# Patient Record
Sex: FEMALE | Race: WHITE | NOT HISPANIC OR LATINO | Employment: OTHER | URBAN - METROPOLITAN AREA
[De-identification: names, ages, dates, MRNs, and addresses within clinical notes are randomized per-mention and may not be internally consistent; named-entity substitution may affect disease eponyms.]

---

## 2018-02-28 ENCOUNTER — OFFICE VISIT (OUTPATIENT)
Dept: OBGYN CLINIC | Facility: OTHER | Age: 73
End: 2018-02-28
Payer: COMMERCIAL

## 2018-02-28 ENCOUNTER — HOSPITAL ENCOUNTER (OUTPATIENT)
Dept: RADIOLOGY | Facility: HOSPITAL | Age: 73
Discharge: HOME | End: 2018-02-28
Attending: RADIOLOGY

## 2018-02-28 DIAGNOSIS — M87.052 AVASCULAR NECROSIS OF HIP, LEFT (HCC): Primary | ICD-10-CM

## 2018-02-28 DIAGNOSIS — Z76.89 REFERRAL OF PATIENT WITHOUT EXAMINATION OR TREATMENT: ICD-10-CM

## 2018-02-28 PROCEDURE — 99203 OFFICE O/P NEW LOW 30 MIN: CPT | Performed by: ORTHOPAEDIC SURGERY

## 2018-02-28 RX ORDER — METOPROLOL SUCCINATE 25 MG/1
TABLET, EXTENDED RELEASE ORAL
Refills: 2 | COMMUNITY
Start: 2017-12-26

## 2018-02-28 RX ORDER — MELATONIN: COMMUNITY

## 2018-02-28 RX ORDER — BUDESONIDE AND FORMOTEROL FUMARATE DIHYDRATE 160; 4.5 UG/1; UG/1
AEROSOL RESPIRATORY (INHALATION)
COMMUNITY
Start: 2017-09-16

## 2018-02-28 RX ORDER — MONTELUKAST SODIUM 10 MG/1
10 TABLET ORAL DAILY
Refills: 2 | COMMUNITY
Start: 2018-01-24

## 2018-02-28 RX ORDER — SIMVASTATIN 20 MG
20 TABLET ORAL DAILY
Refills: 0 | COMMUNITY
Start: 2017-12-14

## 2018-02-28 RX ORDER — LEVOTHYROXINE SODIUM 112 UG/1
112 TABLET ORAL DAILY
Refills: 0 | COMMUNITY
Start: 2018-02-14

## 2018-03-01 NOTE — PROGRESS NOTES
Orthopaedic Surgery - Office Note  Pj Cotton (70 y o  female)   : 1945   MRN: [id number]  Encounter Date: 2018    Chief Complaint   Patient presents with    Left Hip - Pain       Assessment / Plan  Left hip avascular necrosis    · The imaging and diagnosis were reviewed with the patient  · Treatment options were discussed, including nonsurgical and surgical  · Given duration and severity of symptoms and impact on quality of life, it seems reasonable to consider total hip arthroplasty  · The details of this procedure were discussed at length  · She is interested in further discussion with one of our hip specialists, and I will provide her with this referral  Return to be determined for further evaluation for CARLOS  History of Present Illness  Pj Cotton is a 81 y o  female who presents for evaluation of left hip pain since 2017  There was no injury  She has had gradual worsening of her pain, which has become particularly severe since 2018  She was initially treated for sciatica and sacroilitis, and she completed a course of therapy which did help with some of her posterior hip pain  However, she has continued to have severe left groin pain that radiates down her thigh almost to the knee  It is worse with ambulation and even with hip ROM  She denies numbness in the LLE  She has been ambulating with a cane  At baseline she is a community ambulator without assistance  She does have a history of non-Hodgkins lymphoma and has received prolonged courses of oral steroids as well as radiation treatments  She has been in remission for several years  Review of Systems  Pertinent items are noted in HPI  All other systems were reviewed and are negative  Physical Exam  There were no vitals taken for this visit  Cons: Appears well  No apparent distress  Psych: Alert  Oriented x3  Mood and affect normal   Eyes: PERRLA, EOMI  Resp: Normal effort    No audible wheezing or stridor  CV: Palpable pulse  No discernable arrhythmia  No LE edema  Lymph:  No palpable cervical, axillary, or inguinal lymphadenopathy  Skin: Warm  No palpable masses  No visible lesions  Neuro: Normal muscle tone  Normal and symmetric DTR's  Left Hip Exam  Alignment / Posture:  Normal resting hip posture  Leg lengths appear equal   Inspection:  No swelling  No edema  Palpation:  moderate groin tenderness  ROM:  Hip Flexion 100  Hip ER 40  Hip IR 30  Strength:  Hip Flexors 4/5  Hip Abductors 4/5  Stability:  Not tested  Tests:  (+) [facility]  (-) Straight leg raise  Neurovascular:  Sensation intact in DP/SP/Mancera/Sa/T nerve distributions  2+ DP & PT pulses  Gait:  Antalgic  Studies Reviewed  I have personally reviewed pertinent films in PACS  XR of left hip - avscular necrosis involving the femoral head with moderate collapse and loss of sphericity of the head  CT of left hip - avascular necrosis with loss of sphereicity of femoral head is again seen  There are no acute fractures  Procedures  No procedures today  Medical, Surgical, Family, and Social History  The patient's medical history, family history, and social history, were reviewed and updated as appropriate  No past medical history on file  Past Surgical History:   Procedure Laterality Date    ELBOW SURGERY  12/2006    HYSTERECTOMY      INSERTION CENTRAL VENOUS ACCESS DEVICE W/ SUBCUTANEOUS PORT  2000    removal was 2012       Family History   Problem Relation Age of Onset    Diabetes Maternal Aunt     Diabetes Maternal Uncle        Social History     Occupational History    Not on file       Social History Main Topics    Smoking status: Never Smoker    Smokeless tobacco: Never Used    Alcohol use Not on file    Drug use: Unknown    Sexual activity: Not on file       No Known Allergies      Current Outpatient Prescriptions:     budesonide-formoterol (SYMBICORT) 160-4 5 mcg/act inhaler, USE EVERY 12 HOURS AS DIRECTED BY MD, Disp: , Rfl:     cholecalciferol (VITAMIN D3) 1,000 units tablet, Take by mouth, Disp: , Rfl:     denosumab (PROLIA) 60 mg/mL, Inject under the skin, Disp: , Rfl:     diclofenac sodium (VOLTAREN) 1 %, APPLY AS DIRECTED TO AFFECTED AREA FOUR TIMES A DAY TO LEFT KNEE, Disp: , Rfl: 0    levothyroxine 112 mcg tablet, Take 112 mcg by mouth daily, Disp: , Rfl: 0    MAGNESIUM PO, Take by mouth, Disp: , Rfl:     metoprolol succinate (TOPROL-XL) 25 mg 24 hr tablet, TAKE 1 TABLET IN THE MORNING AND 1/2 AT BEDTIME AS NEEDED FOR PALPITATION, Disp: , Rfl: 2    montelukast (SINGULAIR) 10 mg tablet, Take 10 mg by mouth daily, Disp: , Rfl: 2    MULTIPLE VITAMINS-MINERALS PO, Take by mouth, Disp: , Rfl:     simvastatin (ZOCOR) 20 mg tablet, Take 20 mg by mouth daily, Disp: , Rfl: 0    Tiotropium Bromide Monohydrate (SPIRIVA RESPIMAT) 1 25 MCG/ACT AERS, INHALE 2 PUFFS ONCE DAILY, Disp: , Rfl:       Omar Pickett MD    Scribe Attestation    I,:    am acting as a scribe while in the presence of the attending physician :        I,:    personally performed the services described in this documentation    as scribed in my presence :

## 2018-03-08 ENCOUNTER — OFFICE VISIT (OUTPATIENT)
Dept: OBGYN CLINIC | Facility: HOSPITAL | Age: 73
End: 2018-03-08
Payer: COMMERCIAL

## 2018-03-08 VITALS
HEIGHT: 65 IN | RESPIRATION RATE: 18 BRPM | SYSTOLIC BLOOD PRESSURE: 163 MMHG | BODY MASS INDEX: 24.49 KG/M2 | WEIGHT: 147 LBS | DIASTOLIC BLOOD PRESSURE: 88 MMHG | HEART RATE: 64 BPM

## 2018-03-08 DIAGNOSIS — M87.052 AVASCULAR NECROSIS OF HIP, LEFT (HCC): Primary | ICD-10-CM

## 2018-03-08 DIAGNOSIS — M16.12 ARTHRITIS OF LEFT HIP: ICD-10-CM

## 2018-03-08 PROCEDURE — 99213 OFFICE O/P EST LOW 20 MIN: CPT | Performed by: ORTHOPAEDIC SURGERY

## 2018-03-08 RX ORDER — CHLORHEXIDINE GLUCONATE 4 G/100ML
SOLUTION TOPICAL DAILY PRN
Status: CANCELLED | OUTPATIENT
Start: 2018-03-08

## 2018-03-08 RX ORDER — FOLIC ACID 1 MG/1
1 TABLET ORAL DAILY
Qty: 30 TABLET | Refills: 0 | Status: SHIPPED | OUTPATIENT
Start: 2018-03-08 | End: 2018-05-24

## 2018-03-08 RX ORDER — FERROUS SULFATE TAB EC 324 MG (65 MG FE EQUIVALENT) 324 (65 FE) MG
324 TABLET DELAYED RESPONSE ORAL
Qty: 60 TABLET | Refills: 0 | Status: SHIPPED | OUTPATIENT
Start: 2018-03-08 | End: 2018-05-24

## 2018-03-08 RX ORDER — TRAMADOL HYDROCHLORIDE 50 MG/1
50 TABLET ORAL EVERY 6 HOURS PRN
COMMUNITY
End: 2018-04-03 | Stop reason: HOSPADM

## 2018-03-08 NOTE — PROGRESS NOTES
81 y o female with history of asthma and non-Hodgkin's lymphoma which is treated with a long course of steroids in the early 2000s presenting with insidious atraumatic onset of left groin pain  She says she 1st noticed this past December but acutely worsened in January  Pain is located in the anterior aspect of her hip and radiates down the front of her thigh  Chest pain planes of minimal pain on the lateral aspect of her that is made worse direct contact area  Groin pain is made worse with any ambulation and relieved by rest   She has tried a course of physical therapy and ultrasound which failed to provide her with any significant relief  Prior to November she was able ambulate without any assistive device however she has just started at the use a cane secondary to her pain      Review of Systems  Review of systems negative unless otherwise specified in HPI    Past Medical History  Past Medical History:   Diagnosis Date    Asthma        Past Surgical History  Past Surgical History:   Procedure Laterality Date    ELBOW SURGERY  12/2006    HYSTERECTOMY      INSERTION CENTRAL VENOUS ACCESS DEVICE W/ SUBCUTANEOUS PORT  2000    removal was 2012       Current Medications  Current Outpatient Prescriptions on File Prior to Visit   Medication Sig Dispense Refill    budesonide-formoterol (SYMBICORT) 160-4 5 mcg/act inhaler USE EVERY 12 HOURS AS DIRECTED BY MD      cholecalciferol (VITAMIN D3) 1,000 units tablet Take by mouth      denosumab (PROLIA) 60 mg/mL Inject under the skin      diclofenac sodium (VOLTAREN) 1 % APPLY AS DIRECTED TO AFFECTED AREA FOUR TIMES A DAY TO LEFT KNEE  0    levothyroxine 112 mcg tablet Take 112 mcg by mouth daily  0    MAGNESIUM PO Take by mouth      metoprolol succinate (TOPROL-XL) 25 mg 24 hr tablet TAKE 1 TABLET IN THE MORNING AND 1/2 AT BEDTIME AS NEEDED FOR PALPITATION  2    montelukast (SINGULAIR) 10 mg tablet Take 10 mg by mouth daily  2    MULTIPLE VITAMINS-MINERALS PO Take by mouth      simvastatin (ZOCOR) 20 mg tablet Take 20 mg by mouth daily  0    Tiotropium Bromide Monohydrate (SPIRIVA RESPIMAT) 1 25 MCG/ACT AERS INHALE 2 PUFFS ONCE DAILY       No current facility-administered medications on file prior to visit  Recent Labs (HCT,HGB,PT,INR,ESR,CRP,GLU,HgA1C)  No results found for: HCT, HGB, WBC, PT, INR, ESR, CRP, GLUCOSE, HGBA1C      Physical exam  · General: Awake, Alert, Oriented  · Eyes: Pupils equal, round and reactive to light  · Heart: regular rate and rhythm  · Lungs: No audible wheezing  · Abdomen: soft  Left lower extremity  · Skin intact  · Non tender to palpation over proximal thigh  · Painful hip flexion and internal rotation  · 4/5 hip flexor/extensor  5/5 knee flexion/extension abduction, and adduction strength  · Stichfield positive  · Sensation intact in s/s/sp/dp/t  · Motor intact fhl/ehl/ta/gsc  · Leg warm and well perfused  Imaging  X-rays of the left hip shows flattening of the femoral head joint space narrowing osteophytic changes    Assessment/Plan:   81 y  o female left hip avascular necrosis with arthritic changes of refractory to previous conservative treatments    · Treatments were discussed with the patient including continued conservative treatment and surgery  A joint decision was made with patient proceed with a left anterior total hip arthroplasty  Risks and benefits were explained including bleeding, infection, persistent pain, dementia nerves or blood vessels, leg-length difference, loss of limb, death, pulmonary embolism and blood clots  Informed consent was obtained she is booked for surgery accordingly  No guarantees were given   We will see patient back in follow-up on a postoperative basis

## 2018-03-09 DIAGNOSIS — M16.12 PRIMARY OSTEOARTHRITIS OF LEFT HIP: Primary | ICD-10-CM

## 2018-03-15 ENCOUNTER — APPOINTMENT (OUTPATIENT)
Dept: LAB | Facility: HOSPITAL | Age: 73
End: 2018-03-15
Payer: COMMERCIAL

## 2018-03-15 ENCOUNTER — TRANSCRIBE ORDERS (OUTPATIENT)
Dept: LAB | Facility: HOSPITAL | Age: 73
End: 2018-03-15

## 2018-03-15 ENCOUNTER — OFFICE VISIT (OUTPATIENT)
Dept: LAB | Facility: HOSPITAL | Age: 73
End: 2018-03-15
Payer: COMMERCIAL

## 2018-03-15 ENCOUNTER — HOSPITAL ENCOUNTER (OUTPATIENT)
Dept: RADIOLOGY | Facility: HOSPITAL | Age: 73
Discharge: HOME | End: 2018-03-15
Payer: COMMERCIAL

## 2018-03-15 DIAGNOSIS — M16.12 ARTHRITIS OF LEFT HIP: ICD-10-CM

## 2018-03-15 LAB
ABO GROUP BLD: NORMAL
ANION GAP SERPL CALCULATED.3IONS-SCNC: 8 MMOL/L (ref 4–13)
APTT PPP: 26 SECONDS (ref 23–35)
ATRIAL RATE: 72 BPM
BASOPHILS # BLD AUTO: 0.02 THOUSANDS/ΜL (ref 0–0.1)
BASOPHILS NFR BLD AUTO: 0 % (ref 0–1)
BILIRUB UR QL STRIP: NEGATIVE
BLD GP AB SCN SERPL QL: NEGATIVE
BUN SERPL-MCNC: 35 MG/DL (ref 5–25)
CALCIUM SERPL-MCNC: 9.4 MG/DL (ref 8.3–10.1)
CHLORIDE SERPL-SCNC: 105 MMOL/L (ref 100–108)
CLARITY UR: CLEAR
CO2 SERPL-SCNC: 26 MMOL/L (ref 21–32)
COLOR UR: YELLOW
CREAT SERPL-MCNC: 1.46 MG/DL (ref 0.6–1.3)
EOSINOPHIL # BLD AUTO: 0.11 THOUSAND/ΜL (ref 0–0.61)
EOSINOPHIL NFR BLD AUTO: 1 % (ref 0–6)
ERYTHROCYTE [DISTWIDTH] IN BLOOD BY AUTOMATED COUNT: 13.9 % (ref 11.6–15.1)
EST. AVERAGE GLUCOSE BLD GHB EST-MCNC: 123 MG/DL
GFR SERPL CREATININE-BSD FRML MDRD: 36 ML/MIN/1.73SQ M
GLUCOSE SERPL-MCNC: 95 MG/DL (ref 65–140)
GLUCOSE UR STRIP-MCNC: NEGATIVE MG/DL
HBA1C MFR BLD: 5.9 % (ref 4.2–6.3)
HCT VFR BLD AUTO: 35 % (ref 34.8–46.1)
HGB BLD-MCNC: 11.4 G/DL (ref 11.5–15.4)
HGB UR QL STRIP.AUTO: NEGATIVE
INR PPP: 0.99 (ref 0.86–1.16)
KETONES UR STRIP-MCNC: NEGATIVE MG/DL
LEUKOCYTE ESTERASE UR QL STRIP: NEGATIVE
LYMPHOCYTES # BLD AUTO: 1.6 THOUSANDS/ΜL (ref 0.6–4.47)
LYMPHOCYTES NFR BLD AUTO: 17 % (ref 14–44)
MCH RBC QN AUTO: 29.4 PG (ref 26.8–34.3)
MCHC RBC AUTO-ENTMCNC: 32.6 G/DL (ref 31.4–37.4)
MCV RBC AUTO: 90 FL (ref 82–98)
MONOCYTES # BLD AUTO: 1.02 THOUSAND/ΜL (ref 0.17–1.22)
MONOCYTES NFR BLD AUTO: 11 % (ref 4–12)
NEUTROPHILS # BLD AUTO: 6.51 THOUSANDS/ΜL (ref 1.85–7.62)
NEUTS SEG NFR BLD AUTO: 71 % (ref 43–75)
NITRITE UR QL STRIP: NEGATIVE
NRBC BLD AUTO-RTO: 0 /100 WBCS
P AXIS: 56 DEGREES
PH UR STRIP.AUTO: 5 [PH] (ref 4.5–8)
PLATELET # BLD AUTO: 269 THOUSANDS/UL (ref 149–390)
PMV BLD AUTO: 11.3 FL (ref 8.9–12.7)
POTASSIUM SERPL-SCNC: 4.4 MMOL/L (ref 3.5–5.3)
PR INTERVAL: 166 MS
PROT UR STRIP-MCNC: NEGATIVE MG/DL
PROTHROMBIN TIME: 13.1 SECONDS (ref 12.1–14.4)
QRS AXIS: 10 DEGREES
QRSD INTERVAL: 96 MS
QT INTERVAL: 424 MS
QTC INTERVAL: 464 MS
RBC # BLD AUTO: 3.88 MILLION/UL (ref 3.81–5.12)
RH BLD: POSITIVE
SODIUM SERPL-SCNC: 139 MMOL/L (ref 136–145)
SP GR UR STRIP.AUTO: 1.01 (ref 1–1.03)
SPECIMEN EXPIRATION DATE: NORMAL
T WAVE AXIS: 51 DEGREES
UROBILINOGEN UR QL STRIP.AUTO: 0.2 E.U./DL
VENTRICULAR RATE: 72 BPM
WBC # BLD AUTO: 9.27 THOUSAND/UL (ref 4.31–10.16)

## 2018-03-15 PROCEDURE — 85025 COMPLETE CBC W/AUTO DIFF WBC: CPT

## 2018-03-15 PROCEDURE — 85610 PROTHROMBIN TIME: CPT

## 2018-03-15 PROCEDURE — 71046 X-RAY EXAM CHEST 2 VIEWS: CPT

## 2018-03-15 PROCEDURE — 86900 BLOOD TYPING SEROLOGIC ABO: CPT

## 2018-03-15 PROCEDURE — 83036 HEMOGLOBIN GLYCOSYLATED A1C: CPT

## 2018-03-15 PROCEDURE — 93005 ELECTROCARDIOGRAM TRACING: CPT

## 2018-03-15 PROCEDURE — 81003 URINALYSIS AUTO W/O SCOPE: CPT

## 2018-03-15 PROCEDURE — 93010 ELECTROCARDIOGRAM REPORT: CPT | Performed by: INTERNAL MEDICINE

## 2018-03-15 PROCEDURE — 80048 BASIC METABOLIC PNL TOTAL CA: CPT

## 2018-03-15 PROCEDURE — 86850 RBC ANTIBODY SCREEN: CPT

## 2018-03-15 PROCEDURE — 36415 COLL VENOUS BLD VENIPUNCTURE: CPT

## 2018-03-15 PROCEDURE — 85730 THROMBOPLASTIN TIME PARTIAL: CPT

## 2018-03-15 PROCEDURE — 86901 BLOOD TYPING SEROLOGIC RH(D): CPT

## 2018-03-15 NOTE — PRE-PROCEDURE INSTRUCTIONS
Pre-Surgery Instructions:   Medication Instructions    ascorbic acid (VITAMIN C) 500 MG TBCR Instructed patient per Anesthesia Guidelines   budesonide-formoterol (SYMBICORT) 160-4 5 mcg/act inhaler Instructed patient per Anesthesia Guidelines   diclofenac sodium (VOLTAREN) 1 % Instructed patient per Anesthesia Guidelines   ferrous sulfate 324 (65 Fe) mg Instructed patient per Anesthesia Guidelines   folic acid (FOLVITE) 1 mg tablet Instructed patient per Anesthesia Guidelines   levothyroxine 112 mcg tablet Instructed patient per Anesthesia Guidelines   metoprolol succinate (TOPROL-XL) 25 mg 24 hr tablet Instructed patient per Anesthesia Guidelines   montelukast (SINGULAIR) 10 mg tablet Instructed patient per Anesthesia Guidelines   MULTIPLE VITAMINS-MINERALS PO Instructed patient per Anesthesia Guidelines   simvastatin (ZOCOR) 20 mg tablet Instructed patient per Anesthesia Guidelines   Tiotropium Bromide Monohydrate (SPIRIVA RESPIMAT) 1 25 MCG/ACT AERS Instructed patient per Anesthesia Guidelines   traMADol (ULTRAM) 50 mg tablet Instructed patient per Anesthesia Guidelines  Beta blocker Med Class     Continue to take this heart medication on your normal schedule  If this is an oral medication and you take it in the morning, then you may take this medicine with a sip of water  Low Molecular Weight Heparin Med Class     Stop taking this medication at least 12-24 hours prior to surgery/procedure with prescribing Physician and Surgeon consultation  Inhalational Med Class     Continue to take these inhaler medications on your normal schedule up to and including the day of surgery  Leukotriene Inhibitor Med Class     Continue to take this medication on your normal schedule  If this is an oral medication and you take it in the morning, then you may take this medicine with a sip of water  Opioid Med Class     Continue to take this medication on your normal schedule    If this is an oral medication and you take it in the morning, then you may take this medicine with a sip of water  Statin Med Class     Continue to take this medication on your normal schedule  If this is an oral medication and you take it in the morning, then you may take this medicine with a sip of water  Thyroxine Med Class     Continue to take this medication on your normal schedule  If this is an oral medication and you take it in the morning, then you may take this medicine with a sip of water

## 2018-03-20 ENCOUNTER — TELEPHONE (OUTPATIENT)
Dept: OBGYN CLINIC | Facility: HOSPITAL | Age: 73
End: 2018-03-20

## 2018-03-20 ENCOUNTER — APPOINTMENT (OUTPATIENT)
Dept: PHYSICAL THERAPY | Facility: CLINIC | Age: 73
End: 2018-03-20
Payer: COMMERCIAL

## 2018-03-20 ENCOUNTER — OFFICE VISIT (OUTPATIENT)
Dept: NEPHROLOGY | Facility: CLINIC | Age: 73
End: 2018-03-20
Payer: COMMERCIAL

## 2018-03-20 VITALS
RESPIRATION RATE: 98 BRPM | WEIGHT: 147.4 LBS | HEIGHT: 65 IN | SYSTOLIC BLOOD PRESSURE: 160 MMHG | DIASTOLIC BLOOD PRESSURE: 78 MMHG | BODY MASS INDEX: 24.56 KG/M2 | HEART RATE: 70 BPM

## 2018-03-20 DIAGNOSIS — N18.30 CKD (CHRONIC KIDNEY DISEASE) STAGE 3, GFR 30-59 ML/MIN (HCC): ICD-10-CM

## 2018-03-20 DIAGNOSIS — Z01.818 PRE-OP EVALUATION: Primary | ICD-10-CM

## 2018-03-20 DIAGNOSIS — M16.12 ARTHRITIS OF LEFT HIP: ICD-10-CM

## 2018-03-20 LAB
SL AMB  POCT GLUCOSE, UA: ABNORMAL
SL AMB LEUKOCYTE ESTERASE,UA: ABNORMAL
SL AMB POCT BILIRUBIN,UA: ABNORMAL
SL AMB POCT BLOOD,UA: ABNORMAL
SL AMB POCT CLARITY,UA: ABNORMAL
SL AMB POCT COLOR,UA: YELLOW
SL AMB POCT KETONES,UA: 15
SL AMB POCT NITRITE,UA: ABNORMAL
SL AMB POCT PH,UA: 6
SL AMB POCT SPECIFIC GRAVITY,UA: 1.02
SL AMB POCT URINE PROTEIN: ABNORMAL
SL AMB POCT UROBILINOGEN: ABNORMAL

## 2018-03-20 PROCEDURE — 99204 OFFICE O/P NEW MOD 45 MIN: CPT | Performed by: INTERNAL MEDICINE

## 2018-03-20 PROCEDURE — 81002 URINALYSIS NONAUTO W/O SCOPE: CPT | Performed by: INTERNAL MEDICINE

## 2018-03-20 NOTE — PROGRESS NOTES
NEPHROLOGY OUTPATIENT CONSULTATION   Sergio Sullivan 81 y o  female MRN: [id number]    Reason for consultation:   Chronic kidney disease    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, anticipated total joint replacement  3  History of non-Hodgkin's lymphoma with history of stem cell transplant, previously treated with [facility], last treatment 2003  4  Anemia, hemoglobin 11 4, immunofixation in 2016 was negative for monoclonal, gammopathy    · Discontinue NSAIDs if possible, at the very least try to limit the dose  · Increase fluid intake  · Repeat BMP in 1 week  · Check renal ultrasound for size and echogenicity  · Assuming renal function is stable, plan is for approximately 3 months postoperatively  HISTORY OF PRESENT ILLNESS:  Connie Dominique as we know is a 81    Review of Systems   Constitutional: Negative for fatigue and fever  Respiratory: Negative for chest tightness and shortness of breath  Cardiovascular: Negative for chest pain and leg swelling  Gastrointestinal: Negative for abdominal pain, diarrhea and nausea  Genitourinary: Negative for dysuria  Musculoskeletal: Positive for arthralgias (left hip)  Neurological: Negative for dizziness, syncope and light-headedness         PAST MEDICAL HISTORY:  Past Medical History:   Diagnosis Date    Asthma     Cancer ([facility] [address] )     non hodgkins lymphoma 2001    Hyperlipidemia     Irregular heart beat        PAST SURGICAL HISTORY:  Past Surgical History:   Procedure Laterality Date    ELBOW SURGERY  12/2006    HYSTERECTOMY      INSERTION CENTRAL VENOUS ACCESS DEVICE W/ SUBCUTANEOUS PORT  2000    removal was 2012    LIMBAL STEM CELL TRANSPLANT      2004    LYMPH NODE BIOPSY      2000    REMOVAL VENOUS PORT (PORT-A-CATH)         ALLERGIES:  Allergies   Allergen Reactions    Celecoxib     Epstein-2 Inhibitors        SOCIAL HISTORY:  History   Alcohol Use    Yes     Comment: occ     History   Drug Use No     History   Smoking Status    Never Smoker   Smokeless Tobacco    Never Used       FAMILY HISTORY:  Family History   Problem Relation Age of Onset    Diabetes Maternal Aunt     Diabetes Maternal Uncle     No Known Problems Mother     No Known Problems Father        MEDICATIONS:    Current Outpatient Prescriptions:     ascorbic acid (VITAMIN C) 500 MG TBCR, Take 1 tablet (500 mg total) by mouth 2 (two) times a day, Disp: 60 each, Rfl: 0    budesonide-formoterol (SYMBICORT) 160-4 5 mcg/act inhaler, USE EVERY 12 HOURS AS DIRECTED BY MD, Disp: , Rfl:     cholecalciferol (VITAMIN D3) 1,000 units tablet, Take by mouth, Disp: , Rfl:     diclofenac sodium (VOLTAREN) 1 %, APPLY AS DIRECTED TO AFFECTED AREA FOUR TIMES A DAY TO LEFT KNEE, Disp: , Rfl: 0    enoxaparin (LOVENOX) 40 mg/0 4 mL, Inject 0 4 mL (40 mg total) under the skin daily for 28 days, Disp: 11 2 mL, Rfl: 0    ferrous sulfate 324 (65 Fe) mg, Take 1 tablet (324 mg total) by mouth 2 (two) times a day before meals, Disp: 60 tablet, Rfl: 0    folic acid (FOLVITE) 1 mg tablet, Take 1 tablet (1 mg total) by mouth daily, Disp: 30 tablet, Rfl: 0    levothyroxine 112 mcg tablet, Take 112 mcg by mouth daily, Disp: , Rfl: 0    MAGNESIUM PO, Take 500 mg by mouth  , Disp: , Rfl:     metoprolol succinate (TOPROL-XL) 25 mg 24 hr tablet, TAKE 1 TABLET IN THE MORNING AND 1/2 AT BEDTIME AS NEEDED FOR PALPITATION, Disp: , Rfl: 2    montelukast (SINGULAIR) 10 mg tablet, Take 10 mg by mouth daily, Disp: , Rfl: 2    MULTIPLE VITAMINS-MINERALS PO, Take by mouth, Disp: , Rfl:     simvastatin (ZOCOR) 20 mg tablet, Take 20 mg by mouth daily, Disp: , Rfl: 0    Tiotropium Bromide Monohydrate (SPIRIVA RESPIMAT) 1 25 MCG/ACT AERS, INHALE 2 PUFFS ONCE DAILY, Disp: , Rfl:     traMADol (ULTRAM) 50 mg tablet, Take 50 mg by mouth every 6 (six) hours as needed for moderate pain, Disp: , Rfl:         PHYSICAL EXAM:  Vitals:    03/20/18 1316   BP: 160/78   BP Location: Left arm   Patient Position: Sitting   Cuff Size: Standard   Pulse: 70   Resp: (!) 98   Weight: 66 9 kg (147 lb 6 4 oz)   Height: 5' 5" (1 651 m)       Physical Exam   Constitutional: She is oriented to person, place, and time  She appears well-developed  No distress  HENT:   Head: Normocephalic  Eyes: Conjunctivae are normal  No scleral icterus  Neck: Neck supple  Cardiovascular: Normal rate and regular rhythm  Pulmonary/Chest: Breath sounds normal  No respiratory distress  She has no wheezes  Abdominal: Bowel sounds are normal  She exhibits no distension  There is no tenderness  Musculoskeletal: She exhibits no edema  Neurological: She is alert and oriented to person, place, and time  Skin: Skin is warm and dry  Psychiatric: She has a normal mood and affect           Laboratory results:   Results for orders placed or performed in visit on 03/20/18   POCT urine dip   Result Value Ref Range    LEUKOCYTE ESTERASE,UA neg      NITRITE,UA neg     SL AMB POCT UROBILINOGEN neg     SL AMB POCT URINE PROTEIN trace      PH,UA 6      BLOOD,UA trace      SPECIFIC GRAVITY,UA 1 020      KETONES,UA 15      BILIRUBIN,UA neg     GLUCOSE, UA neg      COLOR,UA yellow      CLARITY,UA hazy

## 2018-03-22 ENCOUNTER — TELEPHONE (OUTPATIENT)
Dept: OBGYN CLINIC | Facility: HOSPITAL | Age: 73
End: 2018-03-22

## 2018-03-22 NOTE — TELEPHONE ENCOUNTER
Call placed to do pre-op elective admission assessment, spoke with pt  Med list, hx and allergies reviewed with pt  Pt reports she lives alone in a multi-level home  She reports her sister, Karon Dowell, is staying with pt x1 week postoperatively to care for pt  Pt has 2 steps to get into the home and enters to the living level  She reports she already has a first floor setup because she lives on the 1st floor  She has a walk in shower  She reports she has a walker at home without wheels, I suggested she needed one with wheels  Pt reports she will try to obtain a walker with wheels PTA  She has a cane, she is currently ambulating with the cane  She does not have a bedside commode but reports she just had a raised toilet seat installed  She is currently independent with ADLs  Pt uses [facility] on [address] in [facility]  She reports her lovenox is ready to  at her pharmacy, she is planning to pick it up PTA  Pt self manages her meds and uses a pillbox  Pt is planning for outpt PT at [facility]  Pt reporting she has repeat blood work for nephrologist scheduled on 3/27  She denies any flank region pain  Pt denies currently denies CP/SOB  Pt denies having questions/concerns at this time  Pt enrolled in [facility]

## 2018-03-23 ENCOUNTER — ANESTHESIA EVENT (OUTPATIENT)
Dept: PERIOP | Facility: HOSPITAL | Age: 73
DRG: 470 | End: 2018-03-23
Payer: COMMERCIAL

## 2018-03-23 ENCOUNTER — EVALUATION (OUTPATIENT)
Dept: PHYSICAL THERAPY | Facility: CLINIC | Age: 73
End: 2018-03-23
Payer: COMMERCIAL

## 2018-03-23 DIAGNOSIS — M25.552 HIP PAIN, LEFT: Primary | ICD-10-CM

## 2018-03-23 DIAGNOSIS — M16.12 ARTHRITIS OF LEFT HIP: ICD-10-CM

## 2018-03-23 DIAGNOSIS — M87.052 AVASCULAR NECROSIS OF HIP, LEFT (HCC): ICD-10-CM

## 2018-03-23 PROCEDURE — G8979 MOBILITY GOAL STATUS: HCPCS | Performed by: PHYSICAL THERAPIST

## 2018-03-23 PROCEDURE — 97110 THERAPEUTIC EXERCISES: CPT | Performed by: PHYSICAL THERAPIST

## 2018-03-23 PROCEDURE — 97163 PT EVAL HIGH COMPLEX 45 MIN: CPT | Performed by: PHYSICAL THERAPIST

## 2018-03-23 PROCEDURE — G8978 MOBILITY CURRENT STATUS: HCPCS | Performed by: PHYSICAL THERAPIST

## 2018-03-23 NOTE — LETTER
2018    Kel Vences MD  [address]    Patient: Charlie Puga   YOB: 1945   Date of Visit: 3/23/2018     Encounter Diagnosis     ICD-10-CM    1  Hip pain, left M25 552    2  Arthritis of left hip M16 12    3  Avascular necrosis of hip, left [facility]) M87 052        Dear Dr Bridger Fine:    Please review the attached Plan of Care from [facility] recent visit  Please verify that you agree therapy should continue by signing the attached document and sending it back to our office  If you have any questions or concerns, please don't hesitate to call  Sincerely,    Yvonne Perry, PT      Referring Provider:      I certify that I have read the below Plan of Care and certify the need for these services furnished under this plan of treatment while under my care  Kel Vences MD  [facility]          PT Evaluation     Today's date: 3/23/2018  Patient name: Charlie Puga  : 1945  MRN: [id number]  Referring provider: Zoe Aldana MD  Dx:   Encounter Diagnosis     ICD-10-CM    1  Hip pain, left M25 552        Start Time: 0830  Stop Time: 930  Total time in clinic (min): 60 minutes    Assessment    Assessment details: Charlie Puga 81 y o  female presents a three month history of persistent left hip pain  The patient reports no trauma or injury to her left hip  Recent bone scan revealed avascular necrosis of her femoral head, and she is scheduled for left THR (anterior approach) 2018  The patient is seen today to educated and instruct the patient in preparation for her surgery  L Hip Pain rated  8 /10; at worse with weight bearing and walking  L Hip AROM: F= 70   ABD= 12    EXT= 4  Er= 15   IR=  4  L Hip Strength: F 2/5   Abd 1/5  E -3/5  LKnee strength  F 3+   E -4  Patient is ambulating with  walker and or cane    Gait pattern is painful  with decreased step length and gait speed   The patient has difficulty with transfers in and out of bed, and in and out of car  Unable to climb steps reciprocally  Goals  STG   Increase ROM and strength left hip and knee - post THR  Improved ambulation with straight cane - post THR  LTG  Maximize ROM and strength left hip  Resume normal activites      Plan  Plan details: Patient seen for pre-operative visit, and provided information and education in post [facility]  Await post operative orders to initiate rehab program, including TE, and HEP  MT and Gait training         Subjective Patient presents painful gait and limitations in left hip ROM    Objective     L Hip Pain rated  8 /10; at worse with weight bearing and walking  L Hip AROM: F= 70   ABD= 12    EXT= 4  Er= 15   IR=  4  L Hip Strength: F 2/5   Abd 1/5  E -3/5  LKnee strength  F 3+   E -4  Patient is ambulating with  walker and or cane  Gait pattern is painful  with decreased step length and gait speed    Flowsheet Rows    Flowsheet Row Most Recent Value   PT/OT G-Codes   FOTO information reviewed  Yes   Assessment Type  Evaluation   G code set  Mobility: Walking & Moving Around   Mobility: Walking and Moving Around Current Status ()  CM   Mobility: Walking and Moving Around Goal Status ()  [facility]

## 2018-03-23 NOTE — PROGRESS NOTES
PT Evaluation     Today's date: 3/23/2018  Patient name: Clarisa Fowler  : 1945  MRN: [id number]  Referring provider: Jason Anderson MD  Dx:   Encounter Diagnosis     ICD-10-CM    1  Hip pain, left M25 552        Start Time: 830  Stop Time: 930  Total time in clinic (min): 60 minutes    Assessment    Assessment details: Clarisa Fowler 81 y o  female presents a three month history of persistent left hip pain  The patient reports no trauma or injury to her left hip  Recent bone scan revealed avascular necrosis of her femoral head, and she is scheduled for left THR (anterior approach) 2018  The patient is seen today to educated and instruct the patient in preparation for her surgery  L Hip Pain rated  8 /10; at worse with weight bearing and walking  L Hip AROM: F= 70   ABD= 12    EXT= 4  Er= 15   IR=  4  L Hip Strength: F 2/5   Abd 1/5  E -3/5  LKnee strength  F 3+   E -4  Patient is ambulating with  walker and or cane  Gait pattern is painful  with decreased step length and gait speed  The patient has difficulty with transfers in and out of bed, and in and out of car  Unable to climb steps reciprocally  Goals  STG   Increase ROM and strength left hip and knee - post THR  Improved ambulation with straight cane - post THR  LTG  Maximize ROM and strength left hip  Resume normal activites      Plan  Plan details: Patient seen for pre-operative visit, and provided information and education in post [facility] rehab  Await post operative orders to initiate rehab program, including TE, and HEP  MT and Gait training         Subjective Patient presents painful gait and limitations in left hip ROM    Objective     L Hip Pain rated  8 /10; at worse with weight bearing and walking  L Hip AROM: F= 70   ABD= 12    EXT= 4  Er= 15   IR=  4  L Hip Strength: F 2/5   Abd 1/5  E -3/5  LKnee strength  F 3+   E -4  Patient is ambulating with  walker and or cane    Gait pattern is painful  with decreased step length and gait speed    Flowsheet Rows    Flowsheet Row Most Recent Value   PT/OT G-Codes   FOTO information reviewed  Yes   Assessment Type  Evaluation   G code set  Mobility: Walking & Moving Around   Mobility: Walking and Moving Around Current Status ()  CM   Mobility: Walking and Moving Around Goal Status ()  [facility]

## 2018-03-27 ENCOUNTER — TELEPHONE (OUTPATIENT)
Dept: OBGYN CLINIC | Facility: HOSPITAL | Age: 73
End: 2018-03-27

## 2018-03-27 NOTE — TELEPHONE ENCOUNTER
Call placed to notify pt that the lovenox RX was called into the Pharmacy  Lovenox RX is being prepared at [facility] on [address] in [facility]  There will be a $12 00 co-pay  VM left to notify pt

## 2018-03-28 ENCOUNTER — TELEPHONE (OUTPATIENT)
Dept: OBGYN CLINIC | Facility: HOSPITAL | Age: 73
End: 2018-03-28

## 2018-03-28 NOTE — TELEPHONE ENCOUNTER
Call placed to advise pt that I will notify her when she should  her lovenox that was called in  I currently am awaiting a response from pts nephrologist if it is OK to use lovenox and dose postoperatively  Vm left for pt not to  lovenox quite yet and that I would call her back when she can pick it up

## 2018-03-29 ENCOUNTER — TELEPHONE (OUTPATIENT)
Dept: OBGYN CLINIC | Facility: HOSPITAL | Age: 73
End: 2018-03-29

## 2018-03-29 NOTE — TELEPHONE ENCOUNTER
Received notification from Dr Marcio Ornelas office that if pts GFR > 30, we are OK to use lovenox 40mg postoperatively  GFR resulted at 46 on 3/27, updated Melissa [facility]  Per Faye Mejia, based off most recent bloodwork and Dr Marcio Ornelas recommendation, lovenox 40mg OK to use post-operatively  Pt updated that she can  lovenox from pharmacy  Pt will pick it up PTA

## 2018-04-01 NOTE — ANESTHESIA PREPROCEDURE EVALUATION
Review of Systems/Medical History  Patient summary reviewed  Chart reviewed  History of anesthetic complications (IV meds work) PONV    Cardiovascular  EKG reviewed, Hyperlipidemia,   Comment: Hx irregular heartbeat    EKG- SR 72 3/15/2018,  Pulmonary  Asthma: well controlled/ stable Asthma type of rescue: daily inhaler,        GI/Hepatic      Comment: Occas heartburn--prn Tums     Negative  ROS        Endo/Other  Negative endo/other ROS      GYN  Negative gynecology ROS          Hematology    Lymphoma (Non-Hodgkin's 2001--Chemo)   Musculoskeletal  Osteoarthritis,   Comment: Avascular necrosis left hip      Neurology  Negative neurology ROS      Psychology   Negative psychology ROS              Physical Exam    Airway    Mallampati score: II  TM Distance: >3 FB       Dental   No notable dental hx     Cardiovascular  Rhythm: irregular,     Pulmonary  Breath sounds clear to auscultation,     Other Findings  Preop EKG--SR w/frequent PACs (on monitor)      Anesthesia Plan  ASA Score- 2     Anesthesia Type- general with ASA Monitors  Additional Monitors:   Airway Plan: ETT  Comment: Took am Metoprolol  Plan Factors-    Induction- intravenous  Postoperative Plan- Plan for postoperative opioid use  Planned trial extubation    Informed Consent- Anesthetic plan and risks discussed with patient  I personally reviewed this patient with the CRNA  Discussed and agreed on the Anesthesia Plan with the CRNA  Georgette Ormond

## 2018-04-02 ENCOUNTER — APPOINTMENT (OUTPATIENT)
Dept: RADIOLOGY | Facility: HOSPITAL | Age: 73
DRG: 470 | End: 2018-04-02
Payer: COMMERCIAL

## 2018-04-02 ENCOUNTER — HOSPITAL ENCOUNTER (INPATIENT)
Facility: HOSPITAL | Age: 73
LOS: 1 days | Discharge: HOME | DRG: 470 | End: 2018-04-03
Attending: ORTHOPAEDIC SURGERY | Admitting: ORTHOPAEDIC SURGERY
Payer: COMMERCIAL

## 2018-04-02 ENCOUNTER — ANESTHESIA (OUTPATIENT)
Dept: PERIOP | Facility: HOSPITAL | Age: 73
DRG: 470 | End: 2018-04-02
Payer: COMMERCIAL

## 2018-04-02 DIAGNOSIS — Z96.642 STATUS POST TOTAL REPLACEMENT OF LEFT HIP: Primary | ICD-10-CM

## 2018-04-02 LAB
ABO GROUP BLD: NORMAL
ANION GAP SERPL CALCULATED.3IONS-SCNC: 8 MMOL/L (ref 4–13)
BLD GP AB SCN SERPL QL: NEGATIVE
BUN SERPL-MCNC: 29 MG/DL (ref 5–25)
CALCIUM SERPL-MCNC: 9 MG/DL (ref 8.3–10.1)
CHLORIDE SERPL-SCNC: 107 MMOL/L (ref 100–108)
CO2 SERPL-SCNC: 25 MMOL/L (ref 21–32)
CREAT SERPL-MCNC: 1.14 MG/DL (ref 0.6–1.3)
GFR SERPL CREATININE-BSD FRML MDRD: 49 ML/MIN/1.73SQ M
GLUCOSE SERPL-MCNC: 124 MG/DL (ref 65–140)
POTASSIUM SERPL-SCNC: 3.6 MMOL/L (ref 3.5–5.3)
RH BLD: POSITIVE
SODIUM SERPL-SCNC: 140 MMOL/L (ref 136–145)
SPECIMEN EXPIRATION DATE: NORMAL

## 2018-04-02 PROCEDURE — G8978 MOBILITY CURRENT STATUS: HCPCS

## 2018-04-02 PROCEDURE — C1776 JOINT DEVICE (IMPLANTABLE): HCPCS | Performed by: ORTHOPAEDIC SURGERY

## 2018-04-02 PROCEDURE — 73501 X-RAY EXAM HIP UNI 1 VIEW: CPT

## 2018-04-02 PROCEDURE — 97163 PT EVAL HIGH COMPLEX 45 MIN: CPT

## 2018-04-02 PROCEDURE — G8979 MOBILITY GOAL STATUS: HCPCS

## 2018-04-02 PROCEDURE — 27130 TOTAL HIP ARTHROPLASTY: CPT | Performed by: ORTHOPAEDIC SURGERY

## 2018-04-02 PROCEDURE — 80048 BASIC METABOLIC PNL TOTAL CA: CPT | Performed by: ORTHOPAEDIC SURGERY

## 2018-04-02 PROCEDURE — 86850 RBC ANTIBODY SCREEN: CPT | Performed by: ORTHOPAEDIC SURGERY

## 2018-04-02 PROCEDURE — 86901 BLOOD TYPING SEROLOGIC RH(D): CPT | Performed by: ORTHOPAEDIC SURGERY

## 2018-04-02 PROCEDURE — 86900 BLOOD TYPING SEROLOGIC ABO: CPT | Performed by: ORTHOPAEDIC SURGERY

## 2018-04-02 PROCEDURE — 0SRB02A REPLACEMENT OF LEFT HIP JOINT WITH METAL ON POLYETHYLENE SYNTHETIC SUBSTITUTE, UNCEMENTED, OPEN APPROACH: ICD-10-PCS | Performed by: ORTHOPAEDIC SURGERY

## 2018-04-02 PROCEDURE — C1713 ANCHOR/SCREW BN/BN,TIS/BN: HCPCS | Performed by: ORTHOPAEDIC SURGERY

## 2018-04-02 DEVICE — PINNACLE POROCOAT ACETABULAR SHELL SECTOR II 52MM OD
Type: IMPLANTABLE DEVICE | Site: HIP | Status: FUNCTIONAL
Brand: PINNACLE POROCOAT

## 2018-04-02 DEVICE — PINNACLE HIP SOLUTIONS ALTRX POLYETHYLENE ACETABULAR LINER NEUTRAL 36MM ID 52MM OD
Type: IMPLANTABLE DEVICE | Site: HIP | Status: FUNCTIONAL
Brand: PINNACLE ALTRX

## 2018-04-02 DEVICE — M-SPEC METAL FEMORAL HEAD 12/14 TAPER DIAMETER 36MM +1.5: Type: IMPLANTABLE DEVICE | Site: HIP | Status: FUNCTIONAL

## 2018-04-02 DEVICE — PINNACLE CANCELLOUS BONE SCREW 6.5MM X 25MM
Type: IMPLANTABLE DEVICE | Site: HIP | Status: FUNCTIONAL
Brand: PINNACLE

## 2018-04-02 DEVICE — ACTIS DUOFIX HIP PROSTHESIS (FEMORAL STEM 12/14 TAPER CEMENTLESS SIZE 6 STD COLLAR)  CE
Type: IMPLANTABLE DEVICE | Site: HIP | Status: FUNCTIONAL
Brand: ACTIS

## 2018-04-02 RX ORDER — MONTELUKAST SODIUM 10 MG/1
10 TABLET ORAL DAILY
Status: DISCONTINUED | OUTPATIENT
Start: 2018-04-02 | End: 2018-04-03 | Stop reason: HOSPADM

## 2018-04-02 RX ORDER — SODIUM CHLORIDE, SODIUM LACTATE, POTASSIUM CHLORIDE, CALCIUM CHLORIDE 600; 310; 30; 20 MG/100ML; MG/100ML; MG/100ML; MG/100ML
125 INJECTION, SOLUTION INTRAVENOUS CONTINUOUS
Status: DISCONTINUED | OUTPATIENT
Start: 2018-04-02 | End: 2018-04-03 | Stop reason: HOSPADM

## 2018-04-02 RX ORDER — METOPROLOL SUCCINATE 25 MG/1
25 TABLET, EXTENDED RELEASE ORAL SEE ADMIN INSTRUCTIONS
Status: DISCONTINUED | OUTPATIENT
Start: 2018-04-02 | End: 2018-04-02

## 2018-04-02 RX ORDER — TRAMADOL HYDROCHLORIDE 50 MG/1
TABLET ORAL
Qty: 30 TABLET | Refills: 0 | Status: SHIPPED | OUTPATIENT
Start: 2018-04-02 | End: 2018-04-12

## 2018-04-02 RX ORDER — SENNOSIDES 8.6 MG
1 TABLET ORAL DAILY
Status: DISCONTINUED | OUTPATIENT
Start: 2018-04-02 | End: 2018-04-03 | Stop reason: HOSPADM

## 2018-04-02 RX ORDER — FERROUS SULFATE 325(65) MG
325 TABLET ORAL 2 TIMES DAILY WITH MEALS
Status: DISCONTINUED | OUTPATIENT
Start: 2018-04-02 | End: 2018-04-03 | Stop reason: HOSPADM

## 2018-04-02 RX ORDER — CALCIUM CARBONATE 200(500)MG
1000 TABLET,CHEWABLE ORAL DAILY PRN
Status: DISCONTINUED | OUTPATIENT
Start: 2018-04-02 | End: 2018-04-03 | Stop reason: HOSPADM

## 2018-04-02 RX ORDER — ONDANSETRON 2 MG/ML
INJECTION INTRAMUSCULAR; INTRAVENOUS AS NEEDED
Status: DISCONTINUED | OUTPATIENT
Start: 2018-04-02 | End: 2018-04-02 | Stop reason: SURG

## 2018-04-02 RX ORDER — ONDANSETRON 2 MG/ML
4 INJECTION INTRAMUSCULAR; INTRAVENOUS EVERY 6 HOURS PRN
Status: DISCONTINUED | OUTPATIENT
Start: 2018-04-02 | End: 2018-04-03 | Stop reason: HOSPADM

## 2018-04-02 RX ORDER — OXYCODONE HYDROCHLORIDE 5 MG/1
5 TABLET ORAL EVERY 4 HOURS PRN
Status: DISCONTINUED | OUTPATIENT
Start: 2018-04-02 | End: 2018-04-03 | Stop reason: HOSPADM

## 2018-04-02 RX ORDER — METOCLOPRAMIDE HYDROCHLORIDE 5 MG/ML
10 INJECTION INTRAMUSCULAR; INTRAVENOUS ONCE AS NEEDED
Status: DISCONTINUED | OUTPATIENT
Start: 2018-04-02 | End: 2018-04-02 | Stop reason: HOSPADM

## 2018-04-02 RX ORDER — SENNOSIDES 8.6 MG
650 CAPSULE ORAL EVERY 8 HOURS PRN
Qty: 30 TABLET | Refills: 0 | Status: SHIPPED | OUTPATIENT
Start: 2018-04-02 | End: 2018-05-02

## 2018-04-02 RX ORDER — PROPOFOL 10 MG/ML
INJECTION, EMULSION INTRAVENOUS AS NEEDED
Status: DISCONTINUED | OUTPATIENT
Start: 2018-04-02 | End: 2018-04-02 | Stop reason: SURG

## 2018-04-02 RX ORDER — TRAMADOL HYDROCHLORIDE 50 MG/1
50 TABLET ORAL EVERY 6 HOURS PRN
Qty: 30 TABLET | Refills: 0 | Status: SHIPPED | OUTPATIENT
Start: 2018-04-02 | End: 2018-04-12

## 2018-04-02 RX ORDER — LIDOCAINE HYDROCHLORIDE AND EPINEPHRINE 10; 10 MG/ML; UG/ML
INJECTION, SOLUTION INFILTRATION; PERINEURAL AS NEEDED
Status: DISCONTINUED | OUTPATIENT
Start: 2018-04-02 | End: 2018-04-02 | Stop reason: HOSPADM

## 2018-04-02 RX ORDER — MAGNESIUM HYDROXIDE/ALUMINUM HYDROXICE/SIMETHICONE 120; 1200; 1200 MG/30ML; MG/30ML; MG/30ML
30 SUSPENSION ORAL EVERY 6 HOURS PRN
Status: DISCONTINUED | OUTPATIENT
Start: 2018-04-02 | End: 2018-04-03 | Stop reason: HOSPADM

## 2018-04-02 RX ORDER — ALBUMIN, HUMAN INJ 5% 5 %
SOLUTION INTRAVENOUS CONTINUOUS PRN
Status: DISCONTINUED | OUTPATIENT
Start: 2018-04-02 | End: 2018-04-02 | Stop reason: SURG

## 2018-04-02 RX ORDER — FENTANYL CITRATE 50 UG/ML
INJECTION, SOLUTION INTRAMUSCULAR; INTRAVENOUS AS NEEDED
Status: DISCONTINUED | OUTPATIENT
Start: 2018-04-02 | End: 2018-04-02 | Stop reason: SURG

## 2018-04-02 RX ORDER — OXYCODONE HYDROCHLORIDE 5 MG/1
TABLET ORAL
Qty: 30 TABLET | Refills: 0 | Status: SHIPPED | OUTPATIENT
Start: 2018-04-02 | End: 2018-04-12

## 2018-04-02 RX ORDER — OXYCODONE HYDROCHLORIDE 10 MG/1
10 TABLET ORAL EVERY 4 HOURS PRN
Status: DISCONTINUED | OUTPATIENT
Start: 2018-04-02 | End: 2018-04-03 | Stop reason: HOSPADM

## 2018-04-02 RX ORDER — ONDANSETRON 2 MG/ML
4 INJECTION INTRAMUSCULAR; INTRAVENOUS ONCE AS NEEDED
Status: DISCONTINUED | OUTPATIENT
Start: 2018-04-02 | End: 2018-04-02 | Stop reason: HOSPADM

## 2018-04-02 RX ORDER — FENTANYL CITRATE/PF 50 MCG/ML
50 SYRINGE (ML) INJECTION
Status: DISCONTINUED | OUTPATIENT
Start: 2018-04-02 | End: 2018-04-02 | Stop reason: HOSPADM

## 2018-04-02 RX ORDER — DOCUSATE SODIUM 100 MG/1
100 CAPSULE, LIQUID FILLED ORAL 2 TIMES DAILY
Status: DISCONTINUED | OUTPATIENT
Start: 2018-04-02 | End: 2018-04-03 | Stop reason: HOSPADM

## 2018-04-02 RX ORDER — MORPHINE SULFATE 2 MG/ML
2 INJECTION, SOLUTION INTRAMUSCULAR; INTRAVENOUS
Status: DISCONTINUED | OUTPATIENT
Start: 2018-04-02 | End: 2018-04-03 | Stop reason: HOSPADM

## 2018-04-02 RX ORDER — ASCORBIC ACID 500 MG
500 TABLET ORAL 2 TIMES DAILY
Status: DISCONTINUED | OUTPATIENT
Start: 2018-04-02 | End: 2018-04-03 | Stop reason: HOSPADM

## 2018-04-02 RX ORDER — TRAMADOL HYDROCHLORIDE 50 MG/1
50 TABLET ORAL EVERY 6 HOURS SCHEDULED
Status: DISCONTINUED | OUTPATIENT
Start: 2018-04-02 | End: 2018-04-03 | Stop reason: HOSPADM

## 2018-04-02 RX ORDER — SODIUM CHLORIDE, SODIUM LACTATE, POTASSIUM CHLORIDE, CALCIUM CHLORIDE 600; 310; 30; 20 MG/100ML; MG/100ML; MG/100ML; MG/100ML
75 INJECTION, SOLUTION INTRAVENOUS CONTINUOUS
Status: DISPENSED | OUTPATIENT
Start: 2018-04-02 | End: 2018-04-03

## 2018-04-02 RX ORDER — METOPROLOL TARTRATE 5 MG/5ML
INJECTION INTRAVENOUS AS NEEDED
Status: DISCONTINUED | OUTPATIENT
Start: 2018-04-02 | End: 2018-04-02 | Stop reason: SURG

## 2018-04-02 RX ORDER — BUDESONIDE AND FORMOTEROL FUMARATE DIHYDRATE 160; 4.5 UG/1; UG/1
2 AEROSOL RESPIRATORY (INHALATION) EVERY 12 HOURS PRN
Status: DISCONTINUED | OUTPATIENT
Start: 2018-04-02 | End: 2018-04-02

## 2018-04-02 RX ORDER — ROCURONIUM BROMIDE 10 MG/ML
INJECTION, SOLUTION INTRAVENOUS AS NEEDED
Status: DISCONTINUED | OUTPATIENT
Start: 2018-04-02 | End: 2018-04-02 | Stop reason: SURG

## 2018-04-02 RX ORDER — FOLIC ACID 1 MG/1
1 TABLET ORAL DAILY
Status: DISCONTINUED | OUTPATIENT
Start: 2018-04-02 | End: 2018-04-03 | Stop reason: HOSPADM

## 2018-04-02 RX ORDER — GLYCOPYRROLATE 0.2 MG/ML
INJECTION INTRAMUSCULAR; INTRAVENOUS AS NEEDED
Status: DISCONTINUED | OUTPATIENT
Start: 2018-04-02 | End: 2018-04-02 | Stop reason: SURG

## 2018-04-02 RX ORDER — MEPERIDINE HYDROCHLORIDE 25 MG/ML
12.5 INJECTION INTRAMUSCULAR; INTRAVENOUS; SUBCUTANEOUS ONCE AS NEEDED
Status: DISCONTINUED | OUTPATIENT
Start: 2018-04-02 | End: 2018-04-02 | Stop reason: HOSPADM

## 2018-04-02 RX ORDER — ACETAMINOPHEN 325 MG/1
650 TABLET ORAL EVERY 6 HOURS PRN
Status: DISCONTINUED | OUTPATIENT
Start: 2018-04-02 | End: 2018-04-03 | Stop reason: HOSPADM

## 2018-04-02 RX ORDER — MELATONIN
1000 DAILY
Status: DISCONTINUED | OUTPATIENT
Start: 2018-04-02 | End: 2018-04-03 | Stop reason: HOSPADM

## 2018-04-02 RX ORDER — LIDOCAINE HYDROCHLORIDE 10 MG/ML
INJECTION, SOLUTION INFILTRATION; PERINEURAL AS NEEDED
Status: DISCONTINUED | OUTPATIENT
Start: 2018-04-02 | End: 2018-04-02 | Stop reason: SURG

## 2018-04-02 RX ORDER — PROPOFOL 10 MG/ML
INJECTION, EMULSION INTRAVENOUS CONTINUOUS PRN
Status: DISCONTINUED | OUTPATIENT
Start: 2018-04-02 | End: 2018-04-02 | Stop reason: SURG

## 2018-04-02 RX ORDER — SODIUM CHLORIDE, SODIUM LACTATE, POTASSIUM CHLORIDE, CALCIUM CHLORIDE 600; 310; 30; 20 MG/100ML; MG/100ML; MG/100ML; MG/100ML
50 INJECTION, SOLUTION INTRAVENOUS CONTINUOUS
Status: DISCONTINUED | OUTPATIENT
Start: 2018-04-02 | End: 2018-04-02

## 2018-04-02 RX ORDER — LEVOTHYROXINE SODIUM 112 UG/1
112 TABLET ORAL
Status: DISCONTINUED | OUTPATIENT
Start: 2018-04-03 | End: 2018-04-03 | Stop reason: HOSPADM

## 2018-04-02 RX ORDER — HYDROMORPHONE HYDROCHLORIDE 2 MG/ML
INJECTION, SOLUTION INTRAMUSCULAR; INTRAVENOUS; SUBCUTANEOUS AS NEEDED
Status: DISCONTINUED | OUTPATIENT
Start: 2018-04-02 | End: 2018-04-02 | Stop reason: SURG

## 2018-04-02 RX ORDER — CHLORHEXIDINE GLUCONATE 4 G/100ML
SOLUTION TOPICAL DAILY PRN
Status: DISCONTINUED | OUTPATIENT
Start: 2018-04-02 | End: 2018-04-02

## 2018-04-02 RX ADMIN — FENTANYL CITRATE 50 MCG: 50 INJECTION, SOLUTION INTRAMUSCULAR; INTRAVENOUS at 08:19

## 2018-04-02 RX ADMIN — NEOSTIGMINE METHYLSULFATE 5 MG: 1 INJECTION, SOLUTION INTRAMUSCULAR; INTRAVENOUS; SUBCUTANEOUS at 10:10

## 2018-04-02 RX ADMIN — SODIUM CHLORIDE, SODIUM LACTATE, POTASSIUM CHLORIDE, AND CALCIUM CHLORIDE: .6; .31; .03; .02 INJECTION, SOLUTION INTRAVENOUS at 07:55

## 2018-04-02 RX ADMIN — CEFAZOLIN SODIUM 2000 MG: 2 SOLUTION INTRAVENOUS at 08:15

## 2018-04-02 RX ADMIN — FENTANYL CITRATE 50 MCG: 50 INJECTION, SOLUTION INTRAMUSCULAR; INTRAVENOUS at 10:32

## 2018-04-02 RX ADMIN — FENTANYL CITRATE 25 MCG: 50 INJECTION, SOLUTION INTRAMUSCULAR; INTRAVENOUS at 10:35

## 2018-04-02 RX ADMIN — HYDROMORPHONE HYDROCHLORIDE 0.2 MG: 1 INJECTION, SOLUTION INTRAMUSCULAR; INTRAVENOUS; SUBCUTANEOUS at 11:07

## 2018-04-02 RX ADMIN — ONDANSETRON 4 MG: 2 INJECTION INTRAMUSCULAR; INTRAVENOUS at 08:24

## 2018-04-02 RX ADMIN — FENTANYL CITRATE 50 MCG: 50 INJECTION, SOLUTION INTRAMUSCULAR; INTRAVENOUS at 11:38

## 2018-04-02 RX ADMIN — OXYCODONE HYDROCHLORIDE 5 MG: 5 TABLET ORAL at 21:40

## 2018-04-02 RX ADMIN — TRAMADOL HYDROCHLORIDE 50 MG: 50 TABLET, FILM COATED ORAL at 18:23

## 2018-04-02 RX ADMIN — HYDROMORPHONE HYDROCHLORIDE 0.2 MG: 1 INJECTION, SOLUTION INTRAMUSCULAR; INTRAVENOUS; SUBCUTANEOUS at 11:02

## 2018-04-02 RX ADMIN — DOCUSATE SODIUM 100 MG: 100 CAPSULE, LIQUID FILLED ORAL at 18:23

## 2018-04-02 RX ADMIN — DEXAMETHASONE SODIUM PHOSPHATE 10 MG: 10 INJECTION INTRAMUSCULAR; INTRAVENOUS at 08:24

## 2018-04-02 RX ADMIN — PROPOFOL 100 MCG/KG/MIN: 10 INJECTION, EMULSION INTRAVENOUS at 08:24

## 2018-04-02 RX ADMIN — PROPOFOL 200 MG: 10 INJECTION, EMULSION INTRAVENOUS at 08:19

## 2018-04-02 RX ADMIN — METOPROLOL TARTRATE 2 MG: 1 INJECTION, SOLUTION INTRAVENOUS at 09:32

## 2018-04-02 RX ADMIN — OXYCODONE HYDROCHLORIDE AND ACETAMINOPHEN 500 MG: 500 TABLET ORAL at 18:22

## 2018-04-02 RX ADMIN — HYDROMORPHONE HYDROCHLORIDE 0.2 MG: 2 INJECTION, SOLUTION INTRAMUSCULAR; INTRAVENOUS; SUBCUTANEOUS at 09:36

## 2018-04-02 RX ADMIN — ROCURONIUM BROMIDE 10 MG: 10 INJECTION INTRAVENOUS at 08:35

## 2018-04-02 RX ADMIN — TRAMADOL HYDROCHLORIDE 50 MG: 50 TABLET, FILM COATED ORAL at 13:34

## 2018-04-02 RX ADMIN — ROCURONIUM BROMIDE 40 MG: 10 INJECTION INTRAVENOUS at 08:19

## 2018-04-02 RX ADMIN — HYDROMORPHONE HYDROCHLORIDE 0.2 MG: 1 INJECTION, SOLUTION INTRAMUSCULAR; INTRAVENOUS; SUBCUTANEOUS at 10:55

## 2018-04-02 RX ADMIN — HYDROMORPHONE HYDROCHLORIDE 0.2 MG: 2 INJECTION, SOLUTION INTRAMUSCULAR; INTRAVENOUS; SUBCUTANEOUS at 08:55

## 2018-04-02 RX ADMIN — MONTELUKAST SODIUM 10 MG: 10 TABLET, COATED ORAL at 13:36

## 2018-04-02 RX ADMIN — ALBUMIN HUMAN: 0.05 INJECTION, SOLUTION INTRAVENOUS at 10:01

## 2018-04-02 RX ADMIN — HYDROMORPHONE HYDROCHLORIDE 0.2 MG: 2 INJECTION, SOLUTION INTRAMUSCULAR; INTRAVENOUS; SUBCUTANEOUS at 09:50

## 2018-04-02 RX ADMIN — SODIUM CHLORIDE 1000 MG: 9 INJECTION, SOLUTION INTRAVENOUS at 08:20

## 2018-04-02 RX ADMIN — LIDOCAINE HYDROCHLORIDE 30 MG: 10 INJECTION, SOLUTION INFILTRATION; PERINEURAL at 08:19

## 2018-04-02 RX ADMIN — ONDANSETRON 4 MG: 2 INJECTION INTRAMUSCULAR; INTRAVENOUS at 14:26

## 2018-04-02 RX ADMIN — FENTANYL CITRATE 50 MCG: 50 INJECTION, SOLUTION INTRAMUSCULAR; INTRAVENOUS at 11:28

## 2018-04-02 RX ADMIN — CEFAZOLIN SODIUM 2000 MG: 2 SOLUTION INTRAVENOUS at 16:24

## 2018-04-02 RX ADMIN — HYDROMORPHONE HYDROCHLORIDE 0.2 MG: 2 INJECTION, SOLUTION INTRAMUSCULAR; INTRAVENOUS; SUBCUTANEOUS at 09:14

## 2018-04-02 RX ADMIN — FENTANYL CITRATE 50 MCG: 50 INJECTION, SOLUTION INTRAMUSCULAR; INTRAVENOUS at 08:42

## 2018-04-02 RX ADMIN — FENTANYL CITRATE 25 MCG: 50 INJECTION, SOLUTION INTRAMUSCULAR; INTRAVENOUS at 10:40

## 2018-04-02 RX ADMIN — HYDROMORPHONE HYDROCHLORIDE 0.2 MG: 2 INJECTION, SOLUTION INTRAMUSCULAR; INTRAVENOUS; SUBCUTANEOUS at 10:14

## 2018-04-02 RX ADMIN — SODIUM CHLORIDE, SODIUM LACTATE, POTASSIUM CHLORIDE, AND CALCIUM CHLORIDE: .6; .31; .03; .02 INJECTION, SOLUTION INTRAVENOUS at 10:11

## 2018-04-02 RX ADMIN — ROCURONIUM BROMIDE 10 MG: 10 INJECTION INTRAVENOUS at 09:50

## 2018-04-02 RX ADMIN — GLYCOPYRROLATE 0.9 MG: 0.2 INJECTION, SOLUTION INTRAMUSCULAR; INTRAVENOUS at 10:10

## 2018-04-02 RX ADMIN — FENTANYL CITRATE 50 MCG: 50 INJECTION, SOLUTION INTRAMUSCULAR; INTRAVENOUS at 11:18

## 2018-04-02 RX ADMIN — Medication 325 MG: at 18:23

## 2018-04-02 RX ADMIN — HYDROMORPHONE HYDROCHLORIDE 0.2 MG: 1 INJECTION, SOLUTION INTRAMUSCULAR; INTRAVENOUS; SUBCUTANEOUS at 11:14

## 2018-04-02 RX ADMIN — HYDROMORPHONE HYDROCHLORIDE 0.2 MG: 1 INJECTION, SOLUTION INTRAMUSCULAR; INTRAVENOUS; SUBCUTANEOUS at 10:50

## 2018-04-02 NOTE — ANESTHESIA POSTPROCEDURE EVALUATION
Post-Op Assessment Note      CV Status:  Stable    Mental Status:  Alert    Hydration Status:  Stable and euvolemic    PONV Controlled:  None    Airway Patency:  Patent  Airway: intubated    Post Op Vitals Reviewed: Yes          Staff: CRNA       Comments: vss          BP  129/67   Temp (!) 97 4 °F (36 3 °C) (04/02/18 1037)    Pulse 76 (04/02/18 1037)   Resp 18   SpO2 89

## 2018-04-02 NOTE — OP NOTE
[place] [address]                                                                                                        Operating Room    PreOp Dx: left hip DJD    Postop Dx: left hip DJD    Procedure: left anterior total hip arthroplasty    Surgeon: Amadou Freitas MD    Assistants: Chris Angel MD, Ruddy Dennis PA-C    Fluids:     EBL:     Drains: none    Specimens: none    Complications: none    Condition: stable,Transferred to postanesthesia care unit  Implants: Depuy      Acetabulum size 52    Acetabular poly 52/36 neutral    Femur Actis, size 6    Femoral head size 36/+1 5    81 y o  female , presents at this, time, secondary to failed conservative treatment of left hip AVN for left total hip arthroplasty    The patient was told of all the pros and cons of operative and nonoperative intervention  Some of the complications of operative intervention include infection, bleeding, scarring, nerve injury, vascular injury,leg length discrepancy, hip dislocation, continued pain, decreased range of motion, DVT, PE death, loss of limb, fracture, need for further surgery, not obtain an results  The patient wished  Patient did consent for operative intervention for this pathology  Patient was brought to the operating room  Patient was anesthetized as anesthesia team  Patient was prepped and draped in normal sterile fashion  After this was done, we did conduct a time out to make sure correct  Patient was in the room, prepped marked and draped  Implants were in the room, Rep  For the implants were present, DVT prophylaxis and antibiotics were dressed  An anterior approach was used  Incision was made 2 cm lateral and 1 cm distal to the ASIS and extended this incision distally  After going through skin, fatty tissue, fascia saad was identified and incised   The fascial layer Was incised going towards the ASIS And extending it distally to incorporate our entire incision  We were able to go through the internervous planes until we were able to identify the anterior perforating vessels  Once this was done with able to obtain hemostasis  Able to excise the pre-capsular, fatty tissue  After this was done, we were able to do all releases, which included, the inferior portion of the femoral neck going towards the lesser trochanteric region, the iliocapsularis, and also, the piriformis recess  We then were able to make our femoral neck cut  Femoral head was removed  Labrum was excised  Fatty tissue within the acetabulum was also excised  At this, time  We did remain to be appropriate theta angle and anteversion  We did place a trial acetabular component, and with the aid of radiographic imaging  It was noted that all was appropriate  At this, time, we were able to place, our true acetabular component  Our theta angle and anteversion were noted to be appropriate  At this time, we are able to place, our true acetabular poly-ethylene  At this, time  We did release on the medial aspect of the greater trochanteric region  We externally rotated  The femur as well as extending at the hip and adducting the femur to obtain appropriate visualization of the proximal femur  We were able to use our box osteotome, canal finder, and rasp in order to make sure there, we avoided any varus, placement of our implant  We did broach to appropriate size, making sure we had correct version  Calcar planer was then used  Trial femoral neck and head were placed  Patient was placed through provocative ranges of motion to make sure that stability was obtained  Radiographic imaging confirmed appropriate leg lengths  At this time  We did remove a trial femoral neck and head  After the hip was dislocated  All broach handle was placed within a trial femoral component to make sure that the implant was still stable   Once this was confirmed, we did remove the trial femoral component  Copious irrigation was used at the operative site  True femoral implant was placed  Trial femoral head was placed once more in order to confirm appropriate leg lengths and appropriate stability  Once as noted to be appropriate, we placed a true femoral head  We did check for stability, leg lengths and final radiographs were taken  Irrigation was used once more at the operative site  Fascial layer was reapproximated with #1 Vicryl sutures  2-0 Vicryl sutures were used for the subcutaneous layer  This was reinforced with staples  The wounds were cleaned and dried  Acticoat, 4 x 4, ABDs, and Tegaderm was placed   Patient was subsequently awakened noted to be in stable condition and transferred to postanesthesia care unit

## 2018-04-02 NOTE — PHYSICAL THERAPY NOTE
Physical Therapy Evaluation    Patient's Name:Kari Marie    Today's Date:04/02/18    Patient Active Problem List   Diagnosis    Avascular necrosis of hip, left ([facility] [address] )    Arthritis of left hip    Status post total replacement of left hip       Past Medical History:   Diagnosis Date    Asthma     Cancer ([facility] [address] )     non hodgkins lymphoma 2001    Hyperlipidemia     Irregular heart beat        Past Surgical History:   Procedure Laterality Date    ELBOW SURGERY  12/2006    HYSTERECTOMY      INSERTION CENTRAL VENOUS ACCESS DEVICE W/ SUBCUTANEOUS PORT  2000    removal was 2012    LIMBAL STEM CELL TRANSPLANT      2004    LYMPH NODE BIOPSY      2000    REMOVAL VENOUS PORT (PORT-A-CATH)            04/02/18 1700   Pain Assessment   Pain Assessment 0-10   Pain Score 5   Pain Location Hip   Pain Orientation Left   Hospital Pain Intervention(s) Ambulation/increased activity;Repositioned;Cold applied; Emotional support   Response to Interventions unchanged   Home Living   Type of Home House   Home Layout Multi-level; Able to live on main level with bedroom/bathroom  (2 TAYLOR)   Prior Function   Level of Tama Independent with ADLs and functional mobility   Lives With Alone   Receives Help From Family  (current plan sister and fam to stay w pt)   Restrictions/Precautions   Weight Bearing Precautions Per Order Yes   LLE Weight Bearing Per Order WBAT   General   Family/Caregiver Present Yes   Cognition   Arousal/Participation Alert   Orientation Level Oriented X4   Following Commands Follows multistep commands without difficulty   RUE Assessment   RUE Assessment (funct reach and grasp)   LUE Assessment   LUE Assessment (funct reach and grasp)   RLE Assessment   RLE Assessment X   Strength RLE   RLE Overall Strength 4-/5   LLE Assessment   LLE Assessment X   Strength LLE   LLE Overall Strength (hip fl 1/5 knee ext 3/5 )   Coordination   Movements are Fluid and Coordinated 0   Coordination and Movement Description antalgic LE instability   Bed Mobility   Supine to Sit 4  Minimal assistance   Additional items HOB elevated; Bedrails; Increased time required;Verbal cues;LE management   Transfers   Sit to Stand 4  Minimal assistance   Additional items Increased time required;Verbal cues   Stand to Sit 4  Minimal assistance   Additional items Increased time required;Verbal cues   Stand pivot 4  Minimal assistance   Additional items Increased time required;Verbal cues  (RW)   Ambulation/Elevation   Gait pattern Improper Weight shift; Antalgic;Decreased foot clearance;Decreased L stance; Short stride; Step to;Excessively slow   Gait Assistance 4  Minimal assist   Additional items Verbal cues; Tactile cues   Assistive Device Rolling walker   Distance 40 ft   Balance   Dynamic Sitting Fair  (forward reach)   Static Standing Fair  (RW)   Dynamic Standing Poor +  (RW)   Endurance Deficit   Endurance Deficit Yes   Endurance Deficit Description antalgic LE instability   Activity Tolerance   Activity Tolerance Patient tolerated treatment well   Nurse Made Aware yes   Assessment   Prognosis Good   Problem List Decreased strength;Decreased range of motion;Decreased endurance; Impaired balance;Decreased mobility; Decreased coordination; Impaired judgement;Decreased safety awareness;Decreased skin integrity;Orthopedic restrictions;Pain   Assessment Pt is a 81 y o  female admitted [facility] on 4/2/2018 w/ L hip AVN OA is  Status post total replacement of left hip anter approach Pt exhibits significant impairments with weakness, decreased ROM, impaired balance, decreased endurance, impaired coordination, gait deviations, pain, decreased activity tolerance, decreased functional mobility tolerance, decreased safety awareness, impaired judgement, fall risk, orthopedic restrictions and decreased skin integrity; these impact independence with mobility, ADLs, and IADLs; requires min assist w transf and amb 40 ft using RW LLE WBAT- gait antalgic decreased step length foot clearance, walker reliant + falls risk;; objective measures on the Barthel Index also reveal limitations;  therapy prognosis is impacted by relevant co morbidities as noted in evaluation; clinical presentation is currently unstable/unpredictable - pt is on cont pulse oximetry, neurovasc checks, presents w phys impairments as noted, a regression from baseline; PTA, pt was Independent with mobility lives in multilevel w single level setup 2 TAYLOR, reports sister and fam support avalable skilled PT is indicated to to optimize functional independence and discharge planning; pending functional progress, PT recommendation at discharge is OP PT and home with family support; RW    Goals   Patient Goals go home   STG Expiration Date 04/16/18   Short Term Goal #1 1  Modified Independent with Bed Mobility Rolling Right and Left     2  Modified Independent with Bed Mobility Supine-Sit     3  Modified Independent with Transfer Bed-Chair     4  Increase Dynamic Sitting Balance at least 1 Grade for improved stability with functional reach activities     5  Increase Dynamic Standing Balance at least 1 Grade for improved ease with Activities of Daily Living     6  Increase Lower Extremity Strength at least 1 Grade for improved ease mobility tasks     7  Modified Independent with  Ambulation 150 feet using RW LLE WBAT to facilitate home and community mobility 8  Modified Independent with Ascending/Descending 12 steps to facilitate home and community accessibility  Plan   Treatment/Interventions Functional transfer training;LE strengthening/ROM; Elevations; Endurance training; Therapeutic exercise;Cognitive reorientation;Equipment eval/education;Patient/family training;Bed mobility;Gait training; Compensatory technique education;Continued evaluation;Spoke to nursing;Family   PT Frequency Twice a day   Recommendation   Recommendation Outpatient PT; Home with family support   Equipment Recommended Walker   Barthel Index   Feeding 10   Bathing 0   Grooming Score 0   Dressing Score 5   Bladder Score 0   Bowels Score 10   Toilet Use Score 5   Transfers (Bed/Chair) Score 10   Mobility (Level Surface) Score 0   Stairs Score 0   Barthel Index Score 40

## 2018-04-02 NOTE — DISCHARGE INSTRUCTIONS
Discharge Instructions - Orthopedics  Christa Romo 81 y o  female MRN: [id number]  Unit/Bed#: PACU 02    Weight Bearing Status:                                           Weight Bearing as tolerated to the left lower extremity  DVT prophylaxis:  Complete course of Lovenox as directed    Pain:  Continue analgesics as directed    Showering Instructions:   Do not shower until followup     Dressing Instructions:   Keep dressing clean, dry and intact until follow up appointment  Driving Instructions:  No driving until cleared by Orthopaedic Surgery  PT/OT:  Continue PT/OT on outpatient basis as directed    Appt Instructions: If you do not have your appointment, please call the clinic at [phone]  Otherwise followup as scheduled below:      Contact the office sooner if you experience any increased numbness/tingling in the extremities

## 2018-04-02 NOTE — PLAN OF CARE
Problem: PHYSICAL THERAPY ADULT  Goal: Performs mobility at highest level of function for planned discharge setting  See evaluation for individualized goals  Treatment/Interventions: Functional transfer training, LE strengthening/ROM, Elevations, Endurance training, Therapeutic exercise, Cognitive reorientation, Equipment eval/education, Patient/family training, Bed mobility, Gait training, Compensatory technique education, Continued evaluation, Spoke to nursing, Family  Equipment Recommended: Yudith Salcido       See flowsheet documentation for full assessment, interventions and recommendations    Prognosis: Good  Problem List: Decreased strength, Decreased range of motion, Decreased endurance, Impaired balance, Decreased mobility, Decreased coordination, Impaired judgement, Decreased safety awareness, Decreased skin integrity, Orthopedic restrictions, Pain  Assessment: Pt is a 81 y o  female admitted [facility] on 4/2/2018 w/ L hip AVN OA is  Status post total replacement of left hip anter approach Pt exhibits significant impairments with weakness, decreased ROM, impaired balance, decreased endurance, impaired coordination, gait deviations, pain, decreased activity tolerance, decreased functional mobility tolerance, decreased safety awareness, impaired judgement, fall risk, orthopedic restrictions and decreased skin integrity; these impact independence with mobility, ADLs, and IADLs; requires min assist w transf and amb 40 ft using RW LLE WBAT- gait antalgic decreased step length foot clearance, walker reliant + falls risk;; objective measures on the Barthel Index also reveal limitations;  therapy prognosis is impacted by relevant co morbidities as noted in evaluation; clinical presentation is currently unstable/unpredictable - pt is on cont pulse oximetry, neurovasc checks, presents w phys impairments as noted, a regression from baseline; PTA, pt was Independent with mobility lives in multilevel w single level setup 2 TALYOR, reports sister and fam support avalable skilled PT is indicated to to optimize functional independence and discharge planning; pending functional progress, PT recommendation at discharge is OP PT and home with family support; RW         Recommendation: Outpatient PT, Home with family support          See flowsheet documentation for full assessment

## 2018-04-02 NOTE — RESTORATIVE TECHNICIAN NOTE
Restorative Specialist Mobility Note       Activity: Bedrest, Dangle, Stand at bedside, Turn, Ambulate in room, Chair     Assistive Device: Front wheel walker     Ambulation Response: Tolerated fairly well  Repositioned: Sitting, Up in chair (chair alarm on)                      Assisted PT during session  For all/addtional information please see PT note(s)

## 2018-04-03 ENCOUNTER — DOCUMENTATION (OUTPATIENT)
Dept: OBGYN CLINIC | Facility: HOSPITAL | Age: 73
End: 2018-04-03

## 2018-04-03 VITALS
HEART RATE: 69 BPM | SYSTOLIC BLOOD PRESSURE: 101 MMHG | HEIGHT: 65 IN | DIASTOLIC BLOOD PRESSURE: 49 MMHG | TEMPERATURE: 98.3 F | RESPIRATION RATE: 18 BRPM | BODY MASS INDEX: 26.67 KG/M2 | OXYGEN SATURATION: 100 % | WEIGHT: 160.05 LBS

## 2018-04-03 LAB
ERYTHROCYTE [DISTWIDTH] IN BLOOD BY AUTOMATED COUNT: 13.7 % (ref 11.6–15.1)
HCT VFR BLD AUTO: 29 % (ref 34.8–46.1)
HGB BLD-MCNC: 9.6 G/DL (ref 11.5–15.4)
MCH RBC QN AUTO: 30.1 PG (ref 26.8–34.3)
MCHC RBC AUTO-ENTMCNC: 33.1 G/DL (ref 31.4–37.4)
MCV RBC AUTO: 91 FL (ref 82–98)
PLATELET # BLD AUTO: 242 THOUSANDS/UL (ref 149–390)
PMV BLD AUTO: 11.1 FL (ref 8.9–12.7)
RBC # BLD AUTO: 3.19 MILLION/UL (ref 3.81–5.12)
WBC # BLD AUTO: 14.2 THOUSAND/UL (ref 4.31–10.16)

## 2018-04-03 PROCEDURE — 99024 POSTOP FOLLOW-UP VISIT: CPT | Performed by: ORTHOPAEDIC SURGERY

## 2018-04-03 PROCEDURE — G8988 SELF CARE GOAL STATUS: HCPCS

## 2018-04-03 PROCEDURE — G8987 SELF CARE CURRENT STATUS: HCPCS

## 2018-04-03 PROCEDURE — 97110 THERAPEUTIC EXERCISES: CPT

## 2018-04-03 PROCEDURE — 97535 SELF CARE MNGMENT TRAINING: CPT

## 2018-04-03 PROCEDURE — 97166 OT EVAL MOD COMPLEX 45 MIN: CPT

## 2018-04-03 PROCEDURE — 85027 COMPLETE CBC AUTOMATED: CPT | Performed by: PHYSICIAN ASSISTANT

## 2018-04-03 PROCEDURE — 97116 GAIT TRAINING THERAPY: CPT

## 2018-04-03 RX ORDER — DOCUSATE SODIUM 100 MG/1
100 CAPSULE, LIQUID FILLED ORAL 2 TIMES DAILY
Qty: 10 CAPSULE | Refills: 0 | Status: SHIPPED | OUTPATIENT
Start: 2018-04-03 | End: 2018-09-20

## 2018-04-03 RX ADMIN — OXYCODONE HYDROCHLORIDE AND ACETAMINOPHEN 500 MG: 500 TABLET ORAL at 08:49

## 2018-04-03 RX ADMIN — FOLIC ACID 1 MG: 1 TABLET ORAL at 08:48

## 2018-04-03 RX ADMIN — ACETAMINOPHEN 650 MG: 325 TABLET, FILM COATED ORAL at 08:48

## 2018-04-03 RX ADMIN — SENNOSIDES 8.6 MG: 8.6 TABLET, FILM COATED ORAL at 08:49

## 2018-04-03 RX ADMIN — TRAMADOL HYDROCHLORIDE 50 MG: 50 TABLET, FILM COATED ORAL at 05:56

## 2018-04-03 RX ADMIN — Medication 325 MG: at 08:48

## 2018-04-03 RX ADMIN — LEVOTHYROXINE SODIUM 112 MCG: 112 TABLET ORAL at 05:56

## 2018-04-03 RX ADMIN — TRAMADOL HYDROCHLORIDE 50 MG: 50 TABLET, FILM COATED ORAL at 00:20

## 2018-04-03 RX ADMIN — Medication 1 TABLET: at 08:48

## 2018-04-03 RX ADMIN — DOCUSATE SODIUM 100 MG: 100 CAPSULE, LIQUID FILLED ORAL at 08:48

## 2018-04-03 RX ADMIN — OXYCODONE HYDROCHLORIDE 10 MG: 10 TABLET ORAL at 10:26

## 2018-04-03 RX ADMIN — CEFAZOLIN SODIUM 2000 MG: 2 SOLUTION INTRAVENOUS at 00:20

## 2018-04-03 RX ADMIN — VITAMIN D, TAB 1000IU (100/BT) 1000 UNITS: 25 TAB at 08:49

## 2018-04-03 RX ADMIN — ENOXAPARIN SODIUM 40 MG: 40 INJECTION SUBCUTANEOUS at 08:49

## 2018-04-03 RX ADMIN — ACETAMINOPHEN 650 MG: 325 TABLET, FILM COATED ORAL at 16:02

## 2018-04-03 RX ADMIN — MONTELUKAST SODIUM 10 MG: 10 TABLET, COATED ORAL at 08:49

## 2018-04-03 RX ADMIN — TRAMADOL HYDROCHLORIDE 50 MG: 50 TABLET, FILM COATED ORAL at 12:05

## 2018-04-03 RX ADMIN — OXYCODONE HYDROCHLORIDE 10 MG: 10 TABLET ORAL at 05:56

## 2018-04-03 NOTE — CASE MANAGEMENT
Initial Clinical Review    Age/Sex: 81 y o  female admitted on 4/2 for elective surgery - OR    Surgery Date: 4/2    Procedure: S/P ARTHROPLASTY HIP TOTAL ANTERIOR (Left Hip)    Anesthesia: General    Admission Orders: Date/Time/Statement: Inpatient 4/2/18 @ 1054 Med Surg    Orders Placed This Encounter   Procedures    Inpatient Admission     Standing Status:   Standing     Number of Occurrences:   1     Order Specific Question:   Admitting Physician     Answer:   Real Lie [[id number]]     Order Specific Question:   Level of Care     Answer:   Med Surg [16]     Order Specific Question:   Estimated length of stay     Answer:   More than 2 Midnights     Order Specific Question:   Certification     Answer:   I certify that inpatient services are medically necessary for this patient for a duration of greater than two midnights  See H&P and MD Progress Notes for additional information about the patient's course of treatment  Vital Signs: /56 (BP Location: Right arm)   Pulse 76   Temp 99 2 °F (37 3 °C) (Oral)   Resp 20   Ht 5' 5" (1 651 m)   Wt 72 6 kg (160 lb 0 9 oz)   SpO2 99%   BMI 26 63 kg/m²     Diet:        Diet Orders            Start     Ordered    04/02/18 1054  Diet Regular; Regular House  Diet effective now     Question Answer Comment   Diet Type Regular    Regular Regular House    RD to adjust diet per protocol?  Yes        04/02/18 1053          Mobility: Activity as tolerated   PT/OT eval and treat    DVT Prophylaxis: Sequential compression device    Scheduled Meds:  Cefazolin  Intravenous x3   ascorbic acid 500 mg Oral BID   cholecalciferol 1,000 Units Oral Daily   docusate sodium 100 mg Oral BID   enoxaparin 40 mg Subcutaneous Daily   ferrous sulfate 325 mg Oral BID With Meals   folic acid 1 mg Oral Daily   levothyroxine 112 mcg Oral Early Morning   montelukast 10 mg Oral Daily   multivitamin-minerals 1 tablet Oral Daily   senna 1 tablet Oral Daily   tiotropium 18 mcg Inhalation Daily   traMADol 50 mg Oral Q6H [address]     Continuous Infusions:  lactated ringers 125 mL/hr Last Rate: 0 mL/hr (04/02/18 1010)   lactated ringers 75 mL/hr Last Rate: 75 mL/hr (04/02/18 1143)     PRN Meds:   acetaminophen    aluminum-magnesium hydroxide-simethicone    calcium carbonate    morphine injection    ondansetron    oxyCODONE po x2

## 2018-04-03 NOTE — OCCUPATIONAL THERAPY NOTE
[address]  Evaluation     Patient Name: Chey Espinal  [id number] Date: 4/3/2018  Problem List  Patient Active Problem List   Diagnosis    Avascular necrosis of hip, left ([facility] )    Arthritis of left hip    Status post total replacement of left hip     Past Medical History  Past Medical History:   Diagnosis Date    Asthma     Cancer ([facility] )     non hodgkins lymphoma 2001    Hyperlipidemia     Irregular heart beat      Past Surgical History  Past Surgical History:   Procedure Laterality Date    ELBOW SURGERY  12/2006    HYSTERECTOMY      INSERTION CENTRAL VENOUS ACCESS DEVICE W/ SUBCUTANEOUS PORT  2000    removal was 2012    LIMBAL STEM CELL TRANSPLANT      2004    LYMPH NODE BIOPSY      2000    GA TOTAL HIP ARTHROPLASTY Left 4/2/2018    Procedure: ARTHROPLASTY HIP TOTAL ANTERIOR;  Surgeon: Aaron Nino MD;  [facility];  Service: Orthopedics    REMOVAL VENOUS PORT (PORT-A-CATH)           04/03/18 1230   Note Type   Note type Eval/Treat   Restrictions/Precautions   Weight Bearing Precautions Per Order Yes   RUE Weight Bearing Per Order WBAT   LUE Weight Bearing Per Order WBAT   RLE Weight Bearing Per Order WBAT   LLE Weight Bearing Per Order WBAT   Pain Assessment   Pain Assessment 0-10   Pain Score 4   Pain Type Acute pain   Pain Location Hip   Pain Orientation Left   Hospital Pain Intervention(s) Repositioned; Ambulation/increased activity; Emotional support   Home Living   Type of [address] Multi-level   Prior Function   Level of Mobile Independent with ADLs and functional mobility   Lives With Alone   Receives Help From Family   ADL Assistance Independent   IADLs Independent   Falls in the last 6 months 0   Vocational Retired   Comments reports family will be staying with her for a short pd post d/c   Lifestyle   Autonomy I adls and mobility - i iadls   Reciprocal Relationships supportive family who will be able to stay with pt post d/c   Service to Others retired   Intrinsic Gratification active pta   Subjective   Subjective offers no c/o   ADL   Eating Assistance 7  Independent   Grooming Assistance 5  Supervision/Setup   [address] 5  Supervision/Setup   LB Bathing Assistance 4  Minimal Assistance   [address] 4  Minimal Assistance    [facility] [facility] [address] 4  Minimal Assistance   Bed Mobility   Additional Comments oob in chair   Transfers   Sit to [address] to Sit 5  Supervision   Stand pivot 5  Supervision   Balance   Static Sitting Fair +   Dynamic Sitting Fair   Static Standing Fair   Dynamic Standing [address], [address] 5   Activity Tolerance   Activity Tolerance Patient tolerated treatment well   RUE Assessment   RUE Assessment WFL   LUE Assessment   LUE Assessment WFL   Cognition   Overall Cognitive Status WFL   Assessment   Limitation Decreased ADL status; Decreased self-care trans;Decreased high-level ADLs; Decreased endurance   Prognosis Good   Assessment Pt is a 81 y o  female who was admitted to [facility] on 4/2/2018 with Status post total replacement of left hip (anterior approach)  Pt's problem list also includes PMH of previous surgery, cancer history and asthma, nhl, hld, irregular heart beat  At baseline pt was completing adls and mobility independently w/o ad  Pt lives alone in multilevel home with 1st floor setup possible  Currently pt requires min assist for overall ADLS and sba for functional mobility/transfers  Pt currently presents with impairments in the following categories -steps to enter environment, difficulty performing ADLS, difficulty performing IADLS  and health management  activity tolerance, endurance and standing balance/tolerance   These impairments, as well as pt's fatigue, pain and risk for falls  limit pt's ability to safely engage in all baseline areas of occupation, includingbathing, dressing, toileting, functional mobility/transfers, house maintenance, meal prep, social participation  and leisure activities however will have family support prn post d/c  From OT standpoint, recommend home with family support upon D/C  OT will continue to follow to address the below stated goals  Goals   Patient Goals go home   STG Time Frame 3-5   Short Term Goal #1 refer to established goals below   Plan   Treatment Interventions ADL retraining;Functional transfer training; Endurance training;Patient/family training;Equipment evaluation/education; Compensatory technique education; Activityengagement   Goal Expiration Date 04/08/18   Treatment Day 1   OT Frequency 3-5x/wk   Additional Treatment Session   Start Time 1220   End Time 1230   Treatment Assessment pt seen for lb adls and to review use of lhae (expressed interest to PT re: equipment) pt was able to start pants over feet w/o use of lhae - unable to christine sock but reports her family can assist with same - was unsure of ability to forward flex to reach to feet post sx - reinforced no need to follow standard THR prec - upon clarificiation, pt felt that she would be ok w/o purchasing LHAE for lb adls -has all necessary dme for home (declined need for [facility] - anticipate d/c home with family support when medically cleared - OT to continue to follow to address goals as stated on eval   Additional Treatment Day 1   Recommendation   OT Discharge Recommendation Home with family support   Barthel Index   Feeding 10   Bathing 0   Grooming Score 5   Dressing Score 5   Bladder Score 10   Bowels Score 10   Toilet Use Score 5   Transfers (Bed/Chair) Score 15   Mobility (Level Surface) Score 0   Stairs Score 0   Barthel Index Score 60       OCCUPATIONAL THERAPY GOALS:    *MOD I ADLS AFTER SETUP WITH USE OF ADAPTIVE DEVICES PRN  *MOD I TOILETING AND CLOTHING MANAGEMENT   *MOD I FUNCTIONAL MOB AND TRANSFERS TO ALL SURFACES WITH FAIR+ TO GOOD BALANCE/SAFETY FOR PARTICIPATION IN DYNAMIC ADLS   *DEMONSTRATE GOOD CARRYOVER WITH SAFE USE OF RW DURING FUNCTIONAL TASKS  *ASSESS DME NEEDS  *INCREASE ACTIVITY TOLERANCE TO 40-45 MIN FOR PARTICIPATION IN ADLS AND ENJOYABLE ACTIVITIES     * ASSIST WITH SAFE D/C RECOMMENDATIONS     Trae Manzo, OT

## 2018-04-03 NOTE — PROGRESS NOTES
Orthopedics   Selam Velasquez 81 y o  female MRN: [id number]  Unit/Bed#: BR3-635-05      Subjective:  81 y  o female post operative day 1 left anterior total hip arthroplasty  Pt doing well  Pain controlled   No issues overnight     Labs:    0  Lab Value Date/Time   HCT 35 0 03/15/2018 1131   HGB 11 4 (L) 03/15/2018 1131   INR 0 99 03/15/2018 1131   WBC 9 27 03/15/2018 1131       Meds:    Current Facility-Administered Medications:     acetaminophen (TYLENOL) tablet 650 mg, 650 mg, Oral, Q6H PRN, ZINA De León-C    aluminum-magnesium hydroxide-simethicone (MYLANTA) 200-200-20 mg/5 mL oral suspension 30 mL, 30 mL, Oral, Q6H PRN, Trang Galicia PA-C    ascorbic acid (VITAMIN C) tablet 500 mg, 500 mg, Oral, BID, ZINA De León-C, 500 mg at 04/02/18 1822    calcium carbonate (TUMS) chewable tablet 1,000 mg, 1,000 mg, Oral, Daily PRN, ZINA De León-C    cholecalciferol (VITAMIN D3) tablet 1,000 Units, 1,000 Units, Oral, Daily, ZINA De León-C    docusate sodium (COLACE) capsule 100 mg, 100 mg, Oral, BID, ZINA De León-C, 100 mg at 04/02/18 1823    enoxaparin (LOVENOX) subcutaneous injection 40 mg, 40 mg, Subcutaneous, Daily, RENEE De LeónC    ferrous sulfate tablet 325 mg, 325 mg, Oral, BID With Meals, Trang Galicia PA-C, 325 mg at 78/23/10 8140    folic acid (FOLVITE) tablet 1 mg, 1 mg, Oral, Daily, ZINA De León-C    lactated ringers infusion, 125 mL/hr, Intravenous, Continuous, Bj Todd MD, Last Rate: 0 mL/hr at 04/02/18 1010    lactated ringers infusion, 75 mL/hr, Intravenous, Continuous, Trang Galicia PA-C, Last Rate: 75 mL/hr at 04/02/18 1143, 75 mL/hr at 04/02/18 1143    levothyroxine tablet 112 mcg, 112 mcg, Oral, Early Morning, Trang Galicia PA-C, 112 mcg at 04/03/18 0556    montelukast (SINGULAIR) tablet 10 mg, 10 mg, Oral, Daily, Trang Galicia PA-C, 10 mg at 04/02/18 1336    morphine injection 2 mg, 2 mg, Intravenous, Q3H PRN, Luciana Mcclelland PA-C    multivitamin-minerals (CENTRUM) tablet 1 tablet, 1 tablet, Oral, Daily, Luciana Mcclelland PA-C    ondansetron [facility]) injection 4 mg, 4 mg, Intravenous, Q6H PRN, Luciana Mcclelland PA-C, 4 mg at 04/02/18 1426    oxyCODONE (ROXICODONE) immediate release tablet 10 mg, 10 mg, Oral, Q4H PRN, Luciana Mcclelland PA-C, 10 mg at 04/03/18 0556    oxyCODONE (ROXICODONE) IR tablet 5 mg, 5 mg, Oral, Q4H PRN, Luciana Mcclelland PA-C, 5 mg at 04/02/18 2140    senna (SENOKOT) tablet 8 6 mg, 1 tablet, Oral, Daily, Luciana Mcclelland PA-C    tiotropium [facility]) capsule for inhaler 18 mcg, 18 mcg, Inhalation, Daily, Jamilah Marcial MD    traMADol (ULTRAM) tablet 50 mg, 50 mg, Oral, Q6H [facility], Luciana Mcclelland PA-C, 50 mg at 04/03/18 0556    Blood Culture:   No results found for: BLOODCX    Wound Culture:   No results found for: WOUNDCULT    Ins and Outs:  I/O last 24 hours: In: 26 [P O :370; I V :1600; IV Piggyback:250]  Out: 1800 [Urine:1550; Blood:250]          Physical Exam:  Vitals:    04/03/18 0300   BP: 117/58   Pulse: 72   Resp: 16   Temp: 98 2 °F (36 8 °C)   SpO2: 97%     Awake, alert, NAD   Nonlabored breathing   left lower extremity:  · Dressings C/D/I  · Sensation intact L1/S1  · Motor intact L3-S1  · 2+ dorsalis pedis     _*_*_*_*_*_*_*_*_*_*_*_*_*_*_*_*_*_*_*_*_*_*_*_*_*_*_*_*_*_*_*_*_*_*_*_*_*_*_*_*_*    Assessment: 81 y  o female post operative day 1 left anterior total hip arthroplasty   Doing well    Plan:  · Weight Bearing as tolerated  · Up and out of bed  · Anterior hip precautions  · DVT prophylaxis  · Analgesics  · PT/OT  · Will continue to assess for acute blood loss anemia    Graham Traore MD

## 2018-04-03 NOTE — PROGRESS NOTES
Saw pt at bedside for post-op follow up  Pt Sitting in recliner chair  Pt reporting her pain is currently 6/10, she reports this is tolerable  She reports "a little dizzy when standing but it goes away quickly"  Pt denying CP/SOB/dizziness/nausea currently  Pt was reminded to change positions slowly  Pt 97% on RA   + strong posterior BL tibial pulses present, cap refill < 3 sec  I placed BL foot pumps on patient  IS is at bedside, I reminded pt to do 10x/hour while awake, pt agreeable  Pt denying having questions/concerns at this time

## 2018-04-03 NOTE — PLAN OF CARE
Problem: OCCUPATIONAL THERAPY ADULT  Goal: Performs self-care activities at highest level of function for planned discharge setting  See evaluation for individualized goals  Treatment Interventions: ADL retraining, Functional transfer training, Endurance training, Patient/family training, Equipment evaluation/education, Compensatory technique education, Activityengagement          See flowsheet documentation for full assessment, interventions and recommendations  Limitation: Decreased ADL status, Decreased self-care trans, Decreased high-level ADLs, Decreased endurance  Prognosis: Good  Assessment: Pt is a 81 y o  female who was admitted to [facility] on 4/2/2018 with Status post total replacement of left hip (anterior approach)  Pt's problem list also includes PMH of previous surgery, cancer history and asthma, nhl, hld, irregular heart beat  At baseline pt was completing adls and mobility independently w/o ad  Pt lives alone in multilevel home with 1st floor setup possible  Currently pt requires min assist for overall ADLS and sba for functional mobility/transfers  Pt currently presents with impairments in the following categories -steps to enter environment, difficulty performing ADLS, difficulty performing IADLS  and health management  activity tolerance, endurance and standing balance/tolerance  These impairments, as well as pt's fatigue, pain and risk for falls  limit pt's ability to safely engage in all baseline areas of occupation, includingbathing, dressing, toileting, functional mobility/transfers, house maintenance, meal prep, social participation  and leisure activities however will have family support prn post d/c  From OT standpoint, recommend home with family support upon D/C  OT will continue to follow to address the below stated goals        OT Discharge Recommendation: Home with family support

## 2018-04-03 NOTE — PHYSICAL THERAPY NOTE
PT Progress Note     04/03/18 0815   Pain Assessment   Pain Assessment 0-10   Pain Score 8   Pain Type Acute pain;Surgical pain   Pain Location Hip   Pain Orientation Left   Hospital Pain Intervention(s) Ambulation/increased activity   Response to Interventions decreased pain and stiffness to 6/10 post mob   Precautions   Total Hip Replacement ABduction; External rotation; Extension   Restrictions/Precautions   Weight Bearing Precautions Per Order Yes   LLE Weight Bearing Per Order WBAT   Other Precautions WBS; Chair Alarm;Pain; Fall Risk   General   Chart Reviewed Yes   Response to Previous Treatment Patient with no complaints from previous session  Family/Caregiver Present Yes   Cognition   Arousal/Participation Alert   Attention Within functional limits   Orientation Level Oriented X4   Following Commands Follows multistep commands without difficulty   Subjective   Subjective pt in bed, agreeable for PT amb   Bed Mobility   Supine to Sit 4  Minimal assistance   Additional items HOB elevated; Increased time required;Verbal cues;LE management; Bedrails   Transfers   Sit to Stand 4  Minimal assistance   Additional items Increased time required;Verbal cues   Stand to Sit 4  Minimal assistance   Additional items Increased time required;Verbal cues   Stand pivot 4  Minimal assistance   Additional items Increased time required;Verbal cues  (RW)   Ambulation/Elevation   Gait pattern Improper Weight shift; Antalgic;Decreased R stance;Decreased foot clearance; Short stride; Step to;Excessively slow   Gait Assistance 4  Minimal assist   Additional items Verbal cues; Tactile cues   Assistive Device Rolling walker   Distance 90 ft   Curbs Min assist up/down 1 curb step   Balance   Dynamic Sitting Fair  (forward reach)   Static Standing Fair +  (RW)   Dynamic Standing Fair -  (RW)   Endurance Deficit   Endurance Deficit Yes   Endurance Deficit Description antalgic LE instability   Activity Tolerance   Activity Tolerance Patient tolerated treatment well   Nurse Made Aware yes   Exercises   Glute Sets Sitting;AROM;10 reps   Knee AROM Sitting;AROM;20 reps   Ankle Pumps Sitting;AROM;20 reps   Assessment   Prognosis Good   Problem List Decreased strength;Decreased range of motion;Decreased endurance; Impaired balance;Decreased mobility; Decreased coordination; Impaired judgement;Decreased safety awareness;Decreased skin integrity;Orthopedic restrictions;Pain   Assessment pt progressed significantly w funct mob and activity tolerance; requires min contact guard assist w transf and amb 90 ft using RW RLE WBAT; gait antalgic walker reliant improved stability noted this session; also requires min contact guard assist up/down 1 curb step ascent and descent forward pattern; innitiated LE ex as noted; encourahged to follow w indep ex as noted in pt packet; also educated on frequent mob, to minimize secondary risks; pt appropriate for d/c home w fam support and f/u outpt PT when med cleared; currently pend OT eval;    Goals   Patient Goals go home   STG Expiration Date 04/16/18   Plan   Treatment/Interventions Functional transfer training;LE strengthening/ROM; Elevations; Therapeutic exercise; Endurance training;Cognitive reorientation;Patient/family training;Equipment eval/education;Gait training;Bed mobility; Compensatory technique education;Continued evaluation;Spoke to nursing   Progress Progressing toward goals   PT Frequency Twice a day   Recommendation   Recommendation Outpatient PT   Equipment Recommended Walker   PT - OK to Discharge Yes  (pend interdisciplinary clearance)

## 2018-04-03 NOTE — PLAN OF CARE
Problem: Potential for Falls  Goal: Patient will remain free of falls  INTERVENTIONS:  - Assess patient frequently for physical needs  -  Identify cognitive and physical deficits and behaviors that affect risk of falls    -  [place] fall precautions as indicated by assessment   - Educate patient/family on patient safety including physical limitations  - Instruct patient to call for assistance with activity based on assessment  - Modify environment to reduce risk of injury  - Consider OT/PT consult to assist with strengthening/mobility   Outcome: Progressing      Problem: Prexisting or High Potential for Compromised Skin Integrity  Goal: Skin integrity is maintained or improved  INTERVENTIONS:  - Identify patients at risk for skin breakdown  - Assess and monitor skin integrity  - Assess and monitor nutrition and hydration status  - Monitor labs (i e  albumin)  - Assess for incontinence   - Turn and reposition patient  - Assist with mobility/ambulation  - Relieve pressure over bony prominences  - Avoid friction and shearing  - Provide appropriate hygiene as needed including keeping skin clean and dry  - Evaluate need for skin moisturizer/barrier cream  - Collaborate with interdisciplinary team (i e  Nutrition, Rehabilitation, etc )   - Patient/family teaching   Outcome: Progressing      Problem: PAIN - ADULT  Goal: Verbalizes/displays adequate comfort level or baseline comfort level  Interventions:  - Encourage patient to monitor pain and request assistance  - Assess pain using appropriate pain scale  - Administer analgesics based on type and severity of pain and evaluate response  - Implement non-pharmacological measures as appropriate and evaluate response  - Consider cultural and social influences on pain and pain management  - Notify physician/advanced practitioner if interventions unsuccessful or patient reports new pain   Outcome: Progressing      Problem: INFECTION - ADULT  Goal: Absence or prevention of progression during hospitalization  INTERVENTIONS:  - Assess and monitor for signs and symptoms of infection  - Monitor lab/diagnostic results  - Monitor all insertion sites, i e  indwelling lines, tubes, and drains  - Monitor endotracheal (as able) and nasal secretions for changes in amount and color  - [place] appropriate cooling/warming therapies per order  - Administer medications as ordered  - Instruct and encourage patient and family to use good hand hygiene technique  - Identify and instruct in appropriate isolation precautions for identified infection/condition   Outcome: Progressing    Goal: Absence of fever/infection during neutropenic period  INTERVENTIONS:  - Monitor WBC  - Implement neutropenic guidelines   Outcome: Progressing      Problem: SAFETY ADULT  Goal: Maintain or return to baseline ADL function  INTERVENTIONS:  -  Assess patient's ability to carry out ADLs; assess patient's baseline for ADL function and identify physical deficits which impact ability to perform ADLs (bathing, care of mouth/teeth, toileting, grooming, dressing, etc )  - Assess/evaluate cause of self-care deficits   - Assess range of motion  - Assess patient's mobility; develop plan if impaired  - Assess patient's need for assistive devices and provide as appropriate  - Encourage maximum independence but intervene and supervise when necessary  ¯ Involve family in performance of ADLs  ¯ Assess for home care needs following discharge   ¯ Request OT consult to assist with ADL evaluation and planning for discharge  ¯ Provide patient education as appropriate   Outcome: Progressing    Goal: Maintain or return mobility status to optimal level  INTERVENTIONS:  - Assess patient's baseline mobility status (ambulation, transfers, stairs, etc )    - Identify cognitive and physical deficits and behaviors that affect mobility  - Identify mobility aids required to assist with transfers and/or ambulation (gait belt, sit-to-stand, lift, walker, cane, etc )  - [place] fall precautions as indicated by assessment  - Record patient progress and toleration of activity level on Mobility SBAR; progress patient to next Phase/Stage  - Instruct patient to call for assistance with activity based on assessment  - Request Rehabilitation consult to assist with strengthening/weightbearing, etc    Outcome: Progressing      Problem: DISCHARGE PLANNING  Goal: Discharge to home or other facility with appropriate resources  INTERVENTIONS:  - Identify barriers to discharge w/patient and caregiver  - Arrange for needed discharge resources and transportation as appropriate  - Identify discharge learning needs (meds, wound care, etc )  - Arrange for interpretive services to assist at discharge as needed  - Refer to Case Management Department for coordinating discharge planning if the patient needs post-hospital services based on physician/advanced practitioner order or complex needs related to functional status, cognitive ability, or social support system   Outcome: Progressing      Problem: Knowledge Deficit  Goal: Patient/family/caregiver demonstrates understanding of disease process, treatment plan, medications, and discharge instructions  Complete learning assessment and assess knowledge base    Interventions:  - Provide teaching at level of understanding  - Provide teaching via preferred learning methods   Outcome: Progressing

## 2018-04-03 NOTE — PLAN OF CARE
Problem: PHYSICAL THERAPY ADULT  Goal: Performs mobility at highest level of function for planned discharge setting  See evaluation for individualized goals  Treatment/Interventions: Functional transfer training, LE strengthening/ROM, Elevations, Endurance training, Therapeutic exercise, Cognitive reorientation, Equipment eval/education, Patient/family training, Bed mobility, Gait training, Compensatory technique education, Continued evaluation, Spoke to nursing, Family  Equipment Recommended: Brant Betancourt       See flowsheet documentation for full assessment, interventions and recommendations  Prognosis: Good  Problem List: Decreased strength, Decreased range of motion, Decreased endurance, Impaired balance, Decreased mobility, Decreased coordination, Impaired judgement, Decreased safety awareness, Decreased skin integrity, Orthopedic restrictions, Pain  Assessment: pt progressed significantly w funct mob and activity tolerance; requires min contact guard assist w transf and amb 90 ft using RW RLE WBAT; gait antalgic walker reliant improved stability noted this session; also requires min contact guard assist up/down 1 curb step ascent and descent forward pattern; innitiated LE ex as noted; encourahged to follow w indep ex as noted in pt packet; also educated on frequent mob, to minimize secondary risks; pt appropriate for d/c home w fam support and f/u outpt PT when med cleared; currently pend OT eval;         Recommendation: Outpatient PT     PT - OK to Discharge: Yes    See flowsheet documentation for full assessment

## 2018-04-03 NOTE — SOCIAL WORK
PT recommending: Outpt PT, and commode  Referral to [facility] for commode

## 2018-04-03 NOTE — DISCHARGE SUMMARY
ORTHOPEDICS DISCHARGE SUMMARY   Roland Soto 81 y o  female MRN: [id number]  Unit/Bed#: YI9-391-42      Attending Physician: Susana Guerra    Admitting diagnosis: Arthritis of left hip [M16 12]    Discharge diagnosis: Arthritis of left hip [M16 12]    Date of admission: 4/2/2018    Date of discharge: 04/03/18    Procedure: Left anterior CARLOS    HPI  This is a 81y o  year old female that presented to the office with signs and symptoms of left hip osteoarthritis  They tried and failed conservative treatment measures and wished to proceed with surgical intervention  The risks, benefits, and complications of the procedure were discussed with the patient and informed consent was obtained  Hospital Course: The patient was admitted to the hospital on 4/2/2018 and underwent an uncomplicated left total hip arthroplasty  They were transferred to the floor after a brief stay in the post-anesthesia care unit  Their pain was well managed with IV and oral pain medications  They began therapy on post operative day #1  Lovenox was also started for DVT prophylaxis post operative day #1  On discharge date pt was cleared by PT and the medicine team and determined to be safe for discharge  Daily discussion was had with the patient, nursing staff, orthopaedic team, and family members if present  All questions were answered to the patients satisifaction  0  Lab Value Date/Time   HGB 9 6 (L) 04/03/2018 0452   HGB 11 4 (L) 03/15/2018 1131       Discharge Instructions: The patient was discharged weight bearing as tolerated to the left lower extremity  Lovenox will be continued for 28 days  Continue PT/OT  Take pain medications as instructed  Maintain anterior  hip precautions  Discharge Medications: For the complete list of discharge medications, please refer to the patient's medication reconciliation

## 2018-04-04 ENCOUNTER — TELEPHONE (OUTPATIENT)
Dept: OBGYN CLINIC | Facility: HOSPITAL | Age: 73
End: 2018-04-04

## 2018-04-04 NOTE — TELEPHONE ENCOUNTER
Surgery scheduler notified me that pt has called her multiple times today requesting home PT and home nursing  I called pt to address her concerns and for post-op follow up  AVS, AVS med list and F/Us reviewed with pt  She confirms she is taking docusate BID, LBM 4/1- she reports she is passing gas, she denies abdominal pain/nausea  I suggested pt try miralax in addition to her docusate, pt agreeable  Pt reports she is ambulating using her walker, she denies falls  Pt denies CP/SOB/dizziness/fevers/calf pain currently  Pt reporting that she does not want outpt PT, she reports "it is too painful to have to get in and out of a car, I want home PT"  Pt reporting she cancelled her outpt PT appointment  She reports she spoke with Dr Cynthia Luque last night because she noticed drainage coming from her incision  Per pt, Dr Cynthia Luque advised her that "as long as drainage does not go past the tape it is OK"  Pt denying drainage past tape, she reports dizziness initially when standing, she reports the dizziness subsides  I reminded pt to change positions slowly  She denies dizziness currently, she denies CP/SOB/fevers  Pt reporting she is "in a lot of pain"  She confirms she is taking oxycodone 5 mg every 6 hours, tramadol every 6 hours and tylenol 650mg Q8 hours  Pt is not agreeable to go to outpt PT "because of the pain", pt not agreeable to be seen in office tomorrow for bandage/drainage evaluation  I spoke with resident Physician Mitra Hallman, told him the whole situation  Per Mitra Hallman, set pt up with home PT and home RN  I spoke with resident Jeniffer Verdugo, requesting he place a referral to home health/[facility] WARE agreeable  I spoke with Soumya Patel with [facility], advised her of situation and asked if she could get pt setup with home health-RN/PT, [facility] agreeable  I called pt back to notify her of the home health (PT and RN) plan, pt very thankful  Pt denies having any other questions/concerns at this time

## 2018-04-04 NOTE — SOCIAL WORK
4/4/183:30 PM  KENTON received a phone call from [address] [place] [address], Nurse Navigator for Ortho, who reported patient called her, and stated she can not do the outpatient therapy, can she have home therapy  Patient lives in [place], [place]  KENTON called Hortencia back to obtain okay to get [facility] other than [facility], because [facility] does not go to that area  [address] [place] [address] stated, yes, to get a home care agency that can go to her area  KENTON sent numerous [facility] referrals by searching patient's zip code for home nursing and PT , awaiting responses

## 2018-04-06 DIAGNOSIS — Z98.890 STATUS POST SURGERY: Primary | ICD-10-CM

## 2018-04-12 ENCOUNTER — HOSPITAL ENCOUNTER (OUTPATIENT)
Dept: RADIOLOGY | Facility: HOSPITAL | Age: 73
Discharge: HOME | End: 2018-04-12
Attending: ORTHOPAEDIC SURGERY
Payer: COMMERCIAL

## 2018-04-12 ENCOUNTER — OFFICE VISIT (OUTPATIENT)
Dept: OBGYN CLINIC | Facility: HOSPITAL | Age: 73
End: 2018-04-12

## 2018-04-12 VITALS
HEIGHT: 65 IN | SYSTOLIC BLOOD PRESSURE: 133 MMHG | HEART RATE: 74 BPM | DIASTOLIC BLOOD PRESSURE: 74 MMHG | BODY MASS INDEX: 23.99 KG/M2 | WEIGHT: 144 LBS

## 2018-04-12 DIAGNOSIS — Z96.642 STATUS POST TOTAL REPLACEMENT OF LEFT HIP: Primary | ICD-10-CM

## 2018-04-12 DIAGNOSIS — Z96.642 STATUS POST TOTAL REPLACEMENT OF LEFT HIP: ICD-10-CM

## 2018-04-12 PROCEDURE — 99024 POSTOP FOLLOW-UP VISIT: CPT | Performed by: ORTHOPAEDIC SURGERY

## 2018-04-12 PROCEDURE — 73502 X-RAY EXAM HIP UNI 2-3 VIEWS: CPT

## 2018-04-12 RX ORDER — LORATADINE 10 MG
500 TABLET ORAL 2 TIMES DAILY
Refills: 0 | COMMUNITY
Start: 2018-03-08 | End: 2018-05-24

## 2018-04-12 NOTE — PROGRESS NOTES
72 y o female presents for 1st postoperative visit 10 days after left anterior total hip arthroplasty  Patient states she has been doing well and denies any motor sensory deficits to the left lower extremity, she is ambulating with a walker for assistance and pain is currently controlled with oral medications  She otherwise has minimal complaints  She has been compliant with lovenox for dvt prophylaxis  She discontinued oxycodone as it was making her loopy  She has been participating in home Therapy       Review of Systems  Review of systems negative unless otherwise specified in HPI    Past Medical History  Past Medical History:   Diagnosis Date    Asthma     Cancer ([facility] [address] )     non hodgkins lymphoma 2001    Hyperlipidemia     Irregular heart beat        Past Surgical History  Past Surgical History:   Procedure Laterality Date    ELBOW SURGERY  12/2006    HYSTERECTOMY      INSERTION CENTRAL VENOUS ACCESS DEVICE W/ SUBCUTANEOUS PORT  2000    removal was 2012    LIMBAL STEM CELL TRANSPLANT      2004    LYMPH NODE BIOPSY      2000    ME TOTAL HIP ARTHROPLASTY Left 4/2/2018    Procedure: ARTHROPLASTY HIP TOTAL ANTERIOR;  Surgeon: Amparo Mckinney MD;  [facility];  Service: Orthopedics    REMOVAL VENOUS PORT (PORT-A-CATH)         Current Medications  Current Outpatient Prescriptions on File Prior to Visit   Medication Sig Dispense Refill    acetaminophen (TYLENOL) 650 mg CR tablet Take 1 tablet (650 mg total) by mouth every 8 (eight) hours as needed for mild pain for up to 30 days 30 tablet 0    ascorbic acid (VITAMIN C) 500 MG TBCR Take 1 tablet (500 mg total) by mouth 2 (two) times a day 60 each 0    budesonide-formoterol (SYMBICORT) 160-4 5 mcg/act inhaler USE EVERY 12 HOURS AS DIRECTED BY MD      cholecalciferol (VITAMIN D3) 1,000 units tablet Take by mouth      diclofenac sodium (VOLTAREN) 1 % APPLY AS DIRECTED TO AFFECTED AREA FOUR TIMES A DAY TO LEFT KNEE  0    docusate sodium (COLACE) 100 mg capsule Take 1 capsule (100 mg total) by mouth 2 (two) times a day 10 capsule 0    enoxaparin (LOVENOX) 40 mg/0 4 mL 1 subcutaneous injection daily x 28 days AFTER surgery 28 Syringe 0    ferrous sulfate 324 (65 Fe) mg Take 1 tablet (324 mg total) by mouth 2 (two) times a day before meals 60 tablet 0    folic acid (FOLVITE) 1 mg tablet Take 1 tablet (1 mg total) by mouth daily 30 tablet 0    levothyroxine 112 mcg tablet Take 112 mcg by mouth daily  0    MAGNESIUM PO Take 500 mg by mouth        metoprolol succinate (TOPROL-XL) 25 mg 24 hr tablet TAKE 1 TABLET IN THE MORNING AND 1/2 AT BEDTIME AS NEEDED FOR PALPITATION  2    montelukast (SINGULAIR) 10 mg tablet Take 10 mg by mouth daily  2    MULTIPLE VITAMINS-MINERALS PO Take by mouth      simvastatin (ZOCOR) 20 mg tablet Take 20 mg by mouth daily  0    Tiotropium Bromide Monohydrate (SPIRIVA RESPIMAT) 1 25 MCG/ACT AERS INHALE 2 PUFFS ONCE DAILY      [DISCONTINUED] oxyCODONE (ROXICODONE) 5 mg immediate release tablet 1-2 tabs po q 6 hrs prn pain 30 tablet 0    [DISCONTINUED] traMADol (ULTRAM) 50 mg tablet 1-2 tabs po q 6 hrs prn pain 30 tablet 0    [DISCONTINUED] traMADol (ULTRAM) 50 mg tablet Take 1 tablet (50 mg total) by mouth every 6 (six) hours as needed for moderate pain for up to 10 days 30 tablet 0     No current facility-administered medications on file prior to visit          Recent Labs [facility]    0  Lab Value Date/Time   HCT 29 0 (L) 04/03/2018 0452   HGB 9 6 (L) 04/03/2018 0452   WBC 14 20 (H) 04/03/2018 0452   INR 0 99 03/15/2018 1131   GLUCOSE 124 04/02/2018 1249   HGBA1C 5 9 03/15/2018 1131         Physical exam  General: Awake, Alert, Oriented  HEENT: No scleral injection, no evidence of facial trauma  Heart: Extremities warm and well perfused  Lungs: No audible wheezing  ·    left  Lower extremity  · Incision well approximated with staples, no erythema drainage or fluctuance  · Extremity warm well perfused  · Intact hip flexion extension ankle dorsiflexion plantar flexion EHL FHL motor functions (4/5 hip flexion)  · Sensation intact L2-S1  · Thigh and calf soft and compressible    Imaging  Plain films of left hip reveal a total hip arthroplasty components in satisfactory position    Procedure - left hip incision staples removed  Assessment:   72 y  o female  postoperative day 10 after a left anterior total hip arthroplasty, doing well  Plan  · Weight Bearing:  As tolerated  · Physical therapy:  To continue on outpatient basis  · Analgesics:  Oral as prescribed  · DVT ppx:  Lovenox, maintain ambulation  · Complete remainder of folate, vit C, iron rxs  · Follow-up 6 weeks for repeat check of hip strength and to check progress with therapy    Zac Joya  04/12/18

## 2018-05-24 ENCOUNTER — OFFICE VISIT (OUTPATIENT)
Dept: OBGYN CLINIC | Facility: HOSPITAL | Age: 73
End: 2018-05-24

## 2018-05-24 ENCOUNTER — HOSPITAL ENCOUNTER (OUTPATIENT)
Dept: RADIOLOGY | Facility: HOSPITAL | Age: 73
Discharge: HOME | End: 2018-05-24
Attending: ORTHOPAEDIC SURGERY
Payer: COMMERCIAL

## 2018-05-24 VITALS — SYSTOLIC BLOOD PRESSURE: 146 MMHG | HEART RATE: 60 BPM | DIASTOLIC BLOOD PRESSURE: 68 MMHG

## 2018-05-24 DIAGNOSIS — Z96.642 STATUS POST TOTAL REPLACEMENT OF LEFT HIP: Primary | ICD-10-CM

## 2018-05-24 DIAGNOSIS — M25.552 PAIN IN LEFT HIP: ICD-10-CM

## 2018-05-24 PROCEDURE — 73502 X-RAY EXAM HIP UNI 2-3 VIEWS: CPT

## 2018-05-24 PROCEDURE — 99024 POSTOP FOLLOW-UP VISIT: CPT | Performed by: ORTHOPAEDIC SURGERY

## 2018-05-24 RX ORDER — AMOXICILLIN 500 MG/1
500 CAPSULE ORAL EVERY 8 HOURS SCHEDULED
Qty: 2 CAPSULE | Refills: 0 | Status: SHIPPED | OUTPATIENT
Start: 2018-05-24 | End: 2018-05-25

## 2018-05-24 RX ORDER — AZITHROMYCIN 250 MG/1
TABLET, FILM COATED ORAL
Refills: 1 | COMMUNITY
Start: 2018-05-10 | End: 2018-09-20

## 2018-05-24 NOTE — PROGRESS NOTES
72 y o female status post left total hip arthroplasty  Patient doing much better  She did refrain from physical therapy for some time secondary to her pneumonia but otherwise she states that she is able to ambulate with minimal pain    Denies any numbness tingling fevers chills    Review of Systems  Review of systems negative unless otherwise specified in HPI    Past Medical History  Past Medical History:   Diagnosis Date    Asthma     Cancer ([facility] [address] )     non hodgkins lymphoma 2001    Hyperlipidemia     Irregular heart beat        Past Surgical History  Past Surgical History:   Procedure Laterality Date    ELBOW SURGERY  12/2006    HYSTERECTOMY      INSERTION CENTRAL VENOUS ACCESS DEVICE W/ SUBCUTANEOUS PORT  2000    removal was 2012    LIMBAL STEM CELL TRANSPLANT      2004    LYMPH NODE BIOPSY      2000    AL TOTAL HIP ARTHROPLASTY Left 4/2/2018    Procedure: ARTHROPLASTY HIP TOTAL ANTERIOR;  Surgeon: Henry Taylor MD;  [facility];  Service: Orthopedics    REMOVAL VENOUS PORT (PORT-A-CATH)         Current Medications  Current Outpatient Prescriptions on File Prior to Visit   Medication Sig Dispense Refill    budesonide-formoterol (SYMBICORT) 160-4 5 mcg/act inhaler USE EVERY 12 HOURS AS DIRECTED BY MD      cholecalciferol (VITAMIN D3) 1,000 units tablet Take by mouth      diclofenac sodium (VOLTAREN) 1 % APPLY AS DIRECTED TO AFFECTED AREA FOUR TIMES A DAY TO LEFT KNEE  0    docusate sodium (COLACE) 100 mg capsule Take 1 capsule (100 mg total) by mouth 2 (two) times a day 10 capsule 0    enoxaparin (LOVENOX) 40 mg/0 4 mL 1 subcutaneous injection daily x 28 days AFTER surgery 28 Syringe 0    levothyroxine 112 mcg tablet Take 112 mcg by mouth daily  0    MAGNESIUM PO Take 500 mg by mouth        metoprolol succinate (TOPROL-XL) 25 mg 24 hr tablet TAKE 1 TABLET IN THE MORNING AND 1/2 AT BEDTIME AS NEEDED FOR PALPITATION  2    montelukast (SINGULAIR) 10 mg tablet Take 10 mg by mouth daily 2    MULTIPLE VITAMINS-MINERALS PO Take by mouth      simvastatin (ZOCOR) 20 mg tablet Take 20 mg by mouth daily  0    Tiotropium Bromide Monohydrate (SPIRIVA RESPIMAT) 1 25 MCG/ACT AERS INHALE 2 PUFFS ONCE DAILY      [DISCONTINUED] ascorbic acid (VITAMIN C) 500 MG TBCR Take 1 tablet (500 mg total) by mouth 2 (two) times a day 60 each 0    [DISCONTINUED] [employer] VITAMIN C 500 MG tablet Take 500 mg by mouth 2 (two) times a day  0    [DISCONTINUED] ferrous sulfate 324 (65 Fe) mg Take 1 tablet (324 mg total) by mouth 2 (two) times a day before meals 60 tablet 0    [DISCONTINUED] folic acid (FOLVITE) 1 mg tablet Take 1 tablet (1 mg total) by mouth daily 30 tablet 0     No current facility-administered medications on file prior to visit  Recent Labs [facility])    0  Lab Value Date/Time   HCT 29 0 (L) 04/03/2018 0452   HGB 9 6 (L) 04/03/2018 0452   WBC 14 20 (H) 04/03/2018 0452   INR 0 99 03/15/2018 1131   GLUCOSE 124 04/02/2018 1249   HGBA1C 5 9 03/15/2018 1131         Physical exam  [unfilled]  Left hip:  Incision well healed  Patient able to actively flex the hip  Negative Stinchfield test  Neurologically and vascularly intact distally    Imaging  Left hip:  Implants in place and no sign of loosening    Procedure      Assessment/Plan:   72 y  o female status post left anterior total hip arthroplasty  Plan is as follows:    Weightbearing as tolerated    Physical therapy    Follow-up in 3 months    Discussed treatment plan with patient and she is in agreement treatment plan    Thank you

## 2018-06-25 ENCOUNTER — TELEPHONE (OUTPATIENT)
Dept: OBGYN CLINIC | Facility: HOSPITAL | Age: 73
End: 2018-06-25

## 2018-09-20 ENCOUNTER — OFFICE VISIT (OUTPATIENT)
Dept: OBGYN CLINIC | Facility: HOSPITAL | Age: 73
End: 2018-09-20
Payer: COMMERCIAL

## 2018-09-20 VITALS
WEIGHT: 140 LBS | BODY MASS INDEX: 23.32 KG/M2 | SYSTOLIC BLOOD PRESSURE: 163 MMHG | DIASTOLIC BLOOD PRESSURE: 86 MMHG | HEART RATE: 62 BPM | HEIGHT: 65 IN

## 2018-09-20 DIAGNOSIS — M25.552 PAIN IN LEFT HIP: Primary | ICD-10-CM

## 2018-09-20 PROCEDURE — 99213 OFFICE O/P EST LOW 20 MIN: CPT | Performed by: ORTHOPAEDIC SURGERY

## 2018-09-20 RX ORDER — LEVOTHYROXINE SODIUM 0.12 MG/1
125 TABLET ORAL DAILY
COMMUNITY
End: 2018-09-20

## 2018-09-20 NOTE — PROGRESS NOTES
72 y o female status post 6 months left total hip arthroplasty anterior approach  Patient states she is doing very well regarding her left hip  She continues to do home range of motion stretching strengthening exercise  She states she has no pain in her left lower extremity  She denies any changes in numbness tingling or left lower extremity  She is very happy regarding her left hip replacement at this time      Review of Systems  Review of systems negative unless otherwise specified in HPI    Past Medical History  Past Medical History:   Diagnosis Date    Asthma     Cancer ([address] )     non hodgkins lymphoma 2001    Hyperlipidemia     Irregular heart beat        Past Surgical History  Past Surgical History:   Procedure Laterality Date    ELBOW SURGERY  12/2006    HYSTERECTOMY      INSERTION CENTRAL VENOUS ACCESS DEVICE W/ SUBCUTANEOUS PORT  2000    removal was 2012    LIMBAL STEM CELL TRANSPLANT      2004    LYMPH NODE BIOPSY      2000    [place] TOTAL HIP ARTHROPLASTY Left 4/2/2018    Procedure: ARTHROPLASTY HIP TOTAL ANTERIOR;  Surgeon: Roddy Kebede MD;  [facility];  Service: Orthopedics    REMOVAL VENOUS PORT (PORT-A-CATH)         Current Medications  Current Outpatient Prescriptions on File Prior to Visit   Medication Sig Dispense Refill    budesonide-formoterol (SYMBICORT) 160-4 5 mcg/act inhaler USE EVERY 12 HOURS AS DIRECTED BY MD      cholecalciferol (VITAMIN D3) 1,000 units tablet Take by mouth      levothyroxine 112 mcg tablet Take 112 mcg by mouth daily  0    MAGNESIUM PO Take 500 mg by mouth        metoprolol succinate (TOPROL-XL) 25 mg 24 hr tablet TAKE 1 TABLET IN THE MORNING AND 1/2 AT BEDTIME AS NEEDED FOR PALPITATION  2    montelukast (SINGULAIR) 10 mg tablet Take 10 mg by mouth daily  2    MULTIPLE VITAMINS-MINERALS PO Take by mouth      simvastatin (ZOCOR) 20 mg tablet Take 20 mg by mouth daily  0    [DISCONTINUED] azithromycin (ZITHROMAX) 250 mg tablet TAKE 2 TABLETS BY MOUTH TODAY, THEN TAKE 1 TABLET DAILY FOR 4 DAYS  1    [DISCONTINUED] diclofenac sodium (VOLTAREN) 1 % APPLY AS DIRECTED TO AFFECTED AREA FOUR TIMES A DAY TO LEFT KNEE  0    [DISCONTINUED] docusate sodium (COLACE) 100 mg capsule Take 1 capsule (100 mg total) by mouth 2 (two) times a day 10 capsule 0    [DISCONTINUED] enoxaparin (LOVENOX) 40 mg/0 4 mL 1 subcutaneous injection daily x 28 days AFTER surgery 28 Syringe 0    [DISCONTINUED] Tiotropium Bromide Monohydrate (SPIRIVA RESPIMAT) 1 25 MCG/ACT AERS INHALE 2 PUFFS ONCE DAILY       No current facility-administered medications on file prior to visit  Recent Labs [facility])    0  Lab Value Date/Time   HCT 29 0 (L) 04/03/2018 0452   HGB 9 6 (L) 04/03/2018 0452   WBC 14 20 (H) 04/03/2018 0452   INR 0 99 03/15/2018 1131   HGBA1C 5 9 03/15/2018 1131         Physical exam  · General: Awake, Alert, Oriented  · Eyes: Pupils equal, round and reactive to light  · Heart: regular rate and rhythm  · Lungs: No audible wheezing  · Examination of patient's left hip active hip flexion greater than 100° abduction abduction strength 5/5 hip flexion strength 5/5 no pain with internal-external rotation active straight leg raise ankle dorsi plantar flexion strength intact mild decrease in sensation or lateral femoral cutaneous nerve sensation otherwise intact distal pulses present        Assessment/Plan:   72 y  o female status post almost 6 months left total hip arthroplasty anterior approach doing well  Dental antibiotic prophylaxis recommended  Continue home physical therapy stretching and strengthening exercise  Case discussed with Dr Yesenia Moreno an as-needed basis regarding patient's left hip

## 2018-10-01 ENCOUNTER — OFFICE VISIT (OUTPATIENT)
Dept: NEPHROLOGY | Facility: CLINIC | Age: 73
End: 2018-10-01
Payer: COMMERCIAL

## 2018-10-01 VITALS
HEIGHT: 65 IN | HEART RATE: 60 BPM | BODY MASS INDEX: 23.69 KG/M2 | SYSTOLIC BLOOD PRESSURE: 160 MMHG | WEIGHT: 142.2 LBS | DIASTOLIC BLOOD PRESSURE: 94 MMHG

## 2018-10-01 DIAGNOSIS — N18.30 STAGE 3 CHRONIC KIDNEY DISEASE (HCC): Primary | ICD-10-CM

## 2018-10-01 DIAGNOSIS — D63.1 ANEMIA OF CHRONIC RENAL FAILURE, STAGE 3 (MODERATE) (HCC): ICD-10-CM

## 2018-10-01 DIAGNOSIS — N18.30 ANEMIA OF CHRONIC RENAL FAILURE, STAGE 3 (MODERATE) (HCC): ICD-10-CM

## 2018-10-01 PROCEDURE — 99213 OFFICE O/P EST LOW 20 MIN: CPT | Performed by: INTERNAL MEDICINE

## 2018-10-01 NOTE — PATIENT INSTRUCTIONS
ASSESSMENT and PLAN:  1  Chronic kidney disease, stage III, baseline creatinine seems to be between 1 1 and 1 4, current creatinine 1 14 with an estimated GFR 49  2  Hypertension, stable, home blood pressure readings 120  3   Anemia of chronic disease, to follow up with Hematology, may benefit from IV iron    · No changes in current regimen, renal function appears stable  · Follow-up with PCP as well as hematologist within next few months  · Assuming kidney function is stable, will see her in 1 year with repeat laboratory studies at that time

## 2018-10-01 NOTE — LETTER
October 1, 2018     Anny Lepe MD  [address]    Patient: Rach Rodriguez   YOB: 1945   Date of Visit: 10/1/2018     Dear Dr Jerrod Flannery      Thank you for referring Rach Rodriguez to me for evaluation  Below are the relevant portions of my assessment and plan of care  If you have questions, please do not hesitate to call me  I look forward to following Meryle Felling along with you  Sincerely,        Kezia Castle DO        CC: No Recipients    Progress Notes:    ASSESSMENT and PLAN:  1  Chronic kidney disease, stage III, baseline creatinine seems to be between 1 1 and 1 4, current creatinine 1 14 with an estimated GFR 49  2  Hypertension, stable, home blood pressure readings 120  3   Anemia of chronic disease, to follow up with Hematology, may benefit from IV iron    · No changes in current regimen, renal function appears stable  · Follow-up with PCP as well as hematologist within next few months  · Assuming kidney function is stable, will see her in 1 year with repeat laboratory studies at that time

## 2018-10-01 NOTE — PROGRESS NOTES
NEPHROLOGY OUTPATIENT PROGRESS NOTE   Mimi Ra 72 y o  female MRN: [id number]  Reason for visit:   Chronic kidney disease    ASSESSMENT and PLAN:  1  Chronic kidney disease, stage III, baseline creatinine seems to be between 1 1 and 1 4, current creatinine 1 14 with an estimated GFR 49  2  Hypertension, stable, home blood pressure readings 120  3  Anemia of chronic disease, to follow up with Hematology, may benefit from IV iron    · No changes in current regimen, renal function appears stable  · Follow-up with PCP as well as hematologist within next few months  · Assuming kidney function is stable, will see her in 1 year with repeat laboratory studies at that time    SUBJECTIVE / INTERVAL HISTORY:  She has been doing well  Has healed up quite nicely from a hip surgery  She did have a complicating bronchitis that followed surgery but also seems to have resolved  She does get occasional dizziness lightheadedness which resolves by the afternoon  She is to start checking her blood pressure at home to see if there is any correlation between blood pressure dizziness  No reports of chest pain shortness of breath or swelling  Review of Systems      OBJECTIVE:  /94 (BP Location: Left arm, Patient Position: Sitting, Cuff Size: Standard)   Pulse 60   Ht 5' 5" (1 651 m)   Wt 64 5 kg (142 lb 3 2 oz)   BMI 23 66 kg/m²   Vitals:    10/01/18 1241   Weight: 64 5 kg (142 lb 3 2 oz)       Physical Exam   Constitutional: She is oriented to person, place, and time  No distress  HENT:   Head: Normocephalic  Eyes: No scleral icterus  Neck: Neck supple  Cardiovascular: Normal rate and regular rhythm  Pulmonary/Chest: Effort normal and breath sounds normal    Abdominal: Soft  She exhibits no distension  Musculoskeletal: She exhibits no edema  Neurological: She is alert and oriented to person, place, and time  Skin: Skin is warm and dry  Psychiatric: She has a normal mood and affect  Medications:    Current Outpatient Prescriptions:     budesonide-formoterol (SYMBICORT) 160-4 5 mcg/act inhaler, USE EVERY 12 HOURS AS DIRECTED BY MD, Disp: , Rfl:     cholecalciferol (VITAMIN D3) 1,000 units tablet, Take by mouth, Disp: , Rfl:     Glycopyrrolate-Formoterol (BEVESPI AEROSPHERE IN), Inhale, Disp: , Rfl:     levothyroxine 112 mcg tablet, Take 112 mcg by mouth daily, Disp: , Rfl: 0    MAGNESIUM PO, Take 500 mg by mouth  , Disp: , Rfl:     metoprolol succinate (TOPROL-XL) 25 mg 24 hr tablet, TAKE 1 TABLET IN THE MORNING AND 1/2 AT BEDTIME AS NEEDED FOR PALPITATION, Disp: , Rfl: 2    montelukast (SINGULAIR) 10 mg tablet, Take 10 mg by mouth daily, Disp: , Rfl: 2    MULTIPLE VITAMINS-MINERALS PO, Take by mouth, Disp: , Rfl:     simvastatin (ZOCOR) 20 mg tablet, Take 20 mg by mouth daily, Disp: , Rfl: 0    Laboratory Results:  Results for orders placed or performed during the hospital encounter of 45/08/40   Basic metabolic panel   Result Value Ref Range    Sodium 140 136 - 145 mmol/L    Potassium 3 6 3 5 - 5 3 mmol/L    Chloride 107 100 - 108 mmol/L    CO2 25 21 - 32 mmol/L    ANION GAP 8 4 - 13 mmol/L    BUN 29 (H) 5 - 25 mg/dL    Creatinine 1 14 0 60 - 1 30 mg/dL    Glucose 124 65 - 140 mg/dL    Calcium 9 0 8 3 - 10 1 mg/dL    eGFR 49 ml/min/1 73sq m   CBC (With Platelets)   Result Value Ref Range    WBC 14 20 (H) 4 31 - 10 16 Thousand/uL    RBC 3 19 (L) 3 81 - 5 12 Million/uL    Hemoglobin 9 6 (L) 11 5 - 15 4 g/dL    Hematocrit 29 0 (L) 34 8 - 46 1 %    MCV 91 82 - 98 fL    MCH 30 1 26 8 - 34 3 pg    MCHC 33 1 31 4 - 37 4 g/dL    RDW 13 7 11 6 - 15 1 %    Platelets 521 358 - 065 Thousands/uL    MPV 11 1 8 9 - 12 7 fL   Type and screen   Result Value Ref Range    ABO Grouping O     Rh Factor Positive     Antibody Screen Negative     Specimen Expiration Date [id number]

## 2019-07-31 ENCOUNTER — TELEPHONE (OUTPATIENT)
Dept: NEPHROLOGY | Facility: CLINIC | Age: 74
End: 2019-07-31

## 2024-01-03 NOTE — PATIENT INSTRUCTIONS
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, anticipated total joint replacement  3  History of non-Hodgkin's lymphoma with history of stem cell transplant, previously treated with [facility], last treatment 2003  4  Anemia, hemoglobin 11 4, immunofixation in 2016 was negative for monoclonal, gammopathy    · Discontinue NSAIDs if possible, at the very least try to limit the dose  · Increase fluid intake  · Repeat BMP in 1 week  · Check renal ultrasound for size and echogenicity  · Assuming renal function is stable, plan is for approximately 3 months postoperatively  Acute renal failure superimposed on chronic kidney disease, unspecified acute renal failure type, unspecified CKD stage

## (undated) DEVICE — IMPERVIOUS STOCKINETTE: Brand: DEROYAL

## (undated) DEVICE — BUTTON SWITCH PENCIL HOLSTER: Brand: VALLEYLAB

## (undated) DEVICE — SKIN MARKER DUAL TIP WITH RULER CAP, FLEXIBLE RULER AND LABELS: Brand: DEVON

## (undated) DEVICE — HOOD: Brand: FLYTE, SURGICOOL

## (undated) DEVICE — TRAY FOLEY 16FR URIMETER SURESTEP

## (undated) DEVICE — GAUZE SPONGES,16 PLY: Brand: CURITY

## (undated) DEVICE — INTENDED FOR TISSUE SEPARATION, AND OTHER PROCEDURES THAT REQUIRE A SHARP SURGICAL BLADE TO PUNCTURE OR CUT.: Brand: BARD-PARKER SAFETY BLADES SIZE 10, STERILE

## (undated) DEVICE — GLOVE SRG BIOGEL 8

## (undated) DEVICE — 3M™ TEGADERM™ TRANSPARENT FILM DRESSING FRAME STYLE, 1628, 6 IN X 8 IN (15 CM X 20 CM), 10/CT 8CT/CASE: Brand: 3M™ TEGADERM™

## (undated) DEVICE — CHLORAPREP HI-LITE 26ML ORANGE

## (undated) DEVICE — THE SIMPULSE SOLO SYSTEM WITH ULTREX RETRACTABLE SPLASH SHIELD TIP: Brand: SIMPULSE SOLO

## (undated) DEVICE — PRECISION FALCON 90.0MM OSCILLATING TIP SAW CARTRIDGE: Brand: PRECISION FALCON

## (undated) DEVICE — SILVER-COATED ANTIMICROBIAL BARRIER DRESSING: Brand: ACTICOAT   4" X 8"

## (undated) DEVICE — SUT MONOCRYL 3-0 PS-2 27 IN Y427H

## (undated) DEVICE — NEEDLE 25G X 1 1/2

## (undated) DEVICE — PROXIMATE SKIN STAPLERS (35 WIDE) CONTAINS 35 STAINLESS STEEL STAPLES (FIXED HEAD): Brand: PROXIMATE

## (undated) DEVICE — SYRINGE 10ML LL

## (undated) DEVICE — BLADE ELECTRODE: Brand: EDGE

## (undated) DEVICE — VIOLET BRAIDED (POLYGLACTIN 910), SYNTHETIC ABSORBABLE SUTURE: Brand: COATED VICRYL

## (undated) DEVICE — ULTRACLEAN ACCESSORY ELECTRODE 1" (2.54 CM) COATED BLADE: Brand: ULTRACLEAN

## (undated) DEVICE — DRAPE CAMERA VIDEO

## (undated) DEVICE — HEAVY DUTY TABLE COVER: Brand: CONVERTORS

## (undated) DEVICE — DRAPE C-ARM X-RAY

## (undated) DEVICE — SPONGE PVP SCRUB WING STERILE

## (undated) DEVICE — CAPIT HIP MOP -METAL ON POLY

## (undated) DEVICE — ABDOMINAL PAD: Brand: DERMACEA

## (undated) DEVICE — 3M™ IOBAN™ 2 ANTIMICROBIAL INCISE DRAPE 6650EZ: Brand: IOBAN™ 2

## (undated) DEVICE — SUT VICRYL PLUS 1 CTB-1 36 IN VCPB947H

## (undated) DEVICE — REM POLYHESIVE ADULT PATIENT RETURN ELECTRODE: Brand: VALLEYLAB

## (undated) DEVICE — MEDI-VAC YANKAUER SUCTION HANDLE W/STRAIGHT TIP & CONTROL VENT: Brand: CARDINAL HEALTH

## (undated) DEVICE — SUT VICRYL PLUS 2-0 CTB-1 27 IN VCPB259H

## (undated) DEVICE — DISPOSABLE EQUIPMENT COVER: Brand: SMALL TOWEL DRAPE

## (undated) DEVICE — PACK MAJOR ORTHO W/SPLITS PBDS